# Patient Record
Sex: FEMALE | Race: WHITE | NOT HISPANIC OR LATINO | Employment: OTHER | URBAN - METROPOLITAN AREA
[De-identification: names, ages, dates, MRNs, and addresses within clinical notes are randomized per-mention and may not be internally consistent; named-entity substitution may affect disease eponyms.]

---

## 2020-06-12 ENCOUNTER — TELEPHONE (OUTPATIENT)
Dept: OPHTHALMOLOGY | Facility: CLINIC | Age: 80
End: 2020-06-12

## 2020-06-12 NOTE — TELEPHONE ENCOUNTER
----- Message from Eden Galicia sent at 6/12/2020  1:49 PM CDT -----  Contact: self [phone]  States that she is calling to schedule an appt with Dr Merlos. Please call back at [phone]//thank you acc

## 2020-06-15 ENCOUNTER — TELEPHONE (OUTPATIENT)
Dept: OPHTHALMOLOGY | Facility: CLINIC | Age: 80
End: 2020-06-15

## 2020-06-15 NOTE — TELEPHONE ENCOUNTER
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 But she is calling and requesting an appt w/ Dr. Cheung now.    Pts# [phone]

## 2020-06-15 NOTE — TELEPHONE ENCOUNTER
----- Message from Dane Smith sent at 6/15/2020  1:53 PM CDT -----  Regarding: sooner appointment  Contact: Dr. Maverick Temple would like an appointment with Dr. Cheung. She can be reached at [phone]. She would like something sooner that August 3. I didn't even have August I had 10/5

## 2020-06-15 NOTE — TELEPHONE ENCOUNTER
Pt will keep apt with Dr. Merlos.  Pt will call back if she wants to book in August with Dr. Cheung .  Pt verbalized understanding.

## 2020-06-17 ENCOUNTER — OFFICE VISIT (OUTPATIENT)
Dept: OPHTHALMOLOGY | Facility: CLINIC | Age: 80
End: 2020-06-17
Payer: MEDICARE

## 2020-06-17 DIAGNOSIS — H20.9 UVEITIS OF BOTH EYES: ICD-10-CM

## 2020-06-17 DIAGNOSIS — Z98.890 HISTORY OF REFRACTIVE SURGERY: ICD-10-CM

## 2020-06-17 DIAGNOSIS — H40.1133 PRIMARY OPEN ANGLE GLAUCOMA (POAG) OF BOTH EYES, SEVERE STAGE: Primary | ICD-10-CM

## 2020-06-17 DIAGNOSIS — H04.123 DRY EYE SYNDROME, BILATERAL: ICD-10-CM

## 2020-06-17 PROCEDURE — 92133 CPTRZD OPH DX IMG PST SGM ON: CPT | Mod: S$GLB,,, | Performed by: OPHTHALMOLOGY

## 2020-06-17 PROCEDURE — 92020 GONIOSCOPY: CPT | Mod: S$GLB,,, | Performed by: OPHTHALMOLOGY

## 2020-06-17 PROCEDURE — 92004 COMPRE OPH EXAM NEW PT 1/>: CPT | Mod: S$GLB,,, | Performed by: OPHTHALMOLOGY

## 2020-06-17 PROCEDURE — 99999 PR PBB SHADOW E&M-EST. PATIENT-LVL II: ICD-10-PCS | Mod: PBBFAC,,, | Performed by: OPHTHALMOLOGY

## 2020-06-17 PROCEDURE — 76514 ECHO EXAM OF EYE THICKNESS: CPT | Mod: S$GLB,,, | Performed by: OPHTHALMOLOGY

## 2020-06-17 PROCEDURE — 99999 PR PBB SHADOW E&M-EST. PATIENT-LVL II: CPT | Mod: PBBFAC,,, | Performed by: OPHTHALMOLOGY

## 2020-06-17 PROCEDURE — 76514 PR  US, EYE, FOR CORNEAL THICKNESS: ICD-10-PCS | Mod: S$GLB,,, | Performed by: OPHTHALMOLOGY

## 2020-06-17 PROCEDURE — 92020 PR SPECIAL EYE EVAL,GONIOSCOPY: ICD-10-PCS | Mod: S$GLB,,, | Performed by: OPHTHALMOLOGY

## 2020-06-17 PROCEDURE — 92004 PR EYE EXAM, NEW PATIENT,COMPREHESV: ICD-10-PCS | Mod: S$GLB,,, | Performed by: OPHTHALMOLOGY

## 2020-06-17 PROCEDURE — 92133 POSTERIOR SEGMENT OCT OPTIC NERVE(OCULAR COHERENCE TOMOGRAPHY) - OU - BOTH EYES: ICD-10-PCS | Mod: S$GLB,,, | Performed by: OPHTHALMOLOGY

## 2020-06-17 RX ORDER — DORZOLAMIDE HYDROCHLORIDE AND TIMOLOL MALEATE PRESERVATIVE FREE 20; 5 MG/ML; MG/ML
1 SOLUTION/ DROPS OPHTHALMIC 2 TIMES DAILY
Qty: 60 EACH | Refills: 6 | Status: SHIPPED | OUTPATIENT
Start: 2020-06-17 | End: 2021-01-26 | Stop reason: ALTCHOICE

## 2020-06-17 RX ORDER — BRIMONIDINE TARTRATE 1 MG/ML
SOLUTION/ DROPS OPHTHALMIC
COMMUNITY
Start: 2020-04-15 | End: 2020-12-01 | Stop reason: SDUPTHER

## 2020-06-17 RX ORDER — NETARSUDIL AND LATANOPROST OPHTHALMIC SOLUTION, 0.02%/0.005% .2; .05 MG/ML; MG/ML
SOLUTION/ DROPS OPHTHALMIC; TOPICAL
COMMUNITY
End: 2021-01-12

## 2020-06-17 RX ORDER — LATANOPROST 50 UG/ML
SOLUTION/ DROPS OPHTHALMIC
COMMUNITY
Start: 2020-04-19 | End: 2021-01-12

## 2020-06-17 RX ORDER — ESCITALOPRAM OXALATE 20 MG/1
20 TABLET ORAL
COMMUNITY
Start: 2019-04-08 | End: 2021-02-02

## 2020-06-17 RX ORDER — TAFLUPROST 0 MG/.3ML
1 SOLUTION/ DROPS OPHTHALMIC NIGHTLY
Qty: 1 EACH | Refills: 6 | Status: SHIPPED | OUTPATIENT
Start: 2020-06-17 | End: 2021-01-29 | Stop reason: ALTCHOICE

## 2020-06-17 RX ORDER — DORZOLAMIDE HYDROCHLORIDE AND TIMOLOL MALEATE 20; 5 MG/ML; MG/ML
SOLUTION/ DROPS OPHTHALMIC
COMMUNITY
Start: 2020-04-14 | End: 2021-01-26 | Stop reason: ALTCHOICE

## 2020-06-17 NOTE — PROGRESS NOTES
"HPI     The patient states that at this time her eyes are doing well & denies any   pain at this time. 100% drop compliance.     "Zaki-vi-cody"     pt     Glaucoma OU  RK OU  PCIOL OU   Yag OU  MMC OU  Trab OU  3x Bleb Needling OU (last one 6/2019)    Fhx of Glaucoma (2 brothers)    OU: Dorzolamide BID  OU: Alphagan BID  OU: Latanoprost QHS (was supposed to switch to Rocklatan but was burning   so d/c)    Last edited by Cecilia Worley on 6/17/2020  2:39 PM. (History)            Assessment /Plan     For exam results, see Encounter Report.      ICD-10-CM ICD-9-CM    1. Primary open angle glaucoma (POAG) of both eyes, severe stage  H40.1133 365.11 Posterior Segment OCT Optic Nerve- Both eyes  iop above optimal range OU but patient has significant ocular irritation which would make additional surgery less likely to be successful.     365.73    2. Dry eye syndrome, bilateral  H04.123 375.15    3. Uveitis of both eyes  H20.9 364.3    4. History of refractive surgery  Z98.890 V45.69      Cont latanoprost until zioptan is received in the mail     D/c current meds  Trial Zioptan qd OU  PF Cosopt bid  Return to clinic with hvf 3 weeks. May need repeat surgery - consider Xen if eyes are no longer red.                RETURN TO CLINIC FOR IOP AND REFRACT               "

## 2020-07-07 ENCOUNTER — OFFICE VISIT (OUTPATIENT)
Dept: OPHTHALMOLOGY | Facility: CLINIC | Age: 80
End: 2020-07-07
Payer: MEDICARE

## 2020-07-07 DIAGNOSIS — H04.123 DRY EYE SYNDROME, BILATERAL: ICD-10-CM

## 2020-07-07 DIAGNOSIS — Z98.890 HISTORY OF REFRACTIVE SURGERY: ICD-10-CM

## 2020-07-07 DIAGNOSIS — H20.9 UVEITIS OF BOTH EYES: ICD-10-CM

## 2020-07-07 DIAGNOSIS — H40.1133 PRIMARY OPEN ANGLE GLAUCOMA (POAG) OF BOTH EYES, SEVERE STAGE: Primary | ICD-10-CM

## 2020-07-07 PROCEDURE — 92083 HUMPHREY VISUAL FIELD - OU - BOTH EYES: ICD-10-PCS | Mod: S$GLB,,, | Performed by: OPHTHALMOLOGY

## 2020-07-07 PROCEDURE — 99999 PR PBB SHADOW E&M-EST. PATIENT-LVL III: ICD-10-PCS | Mod: PBBFAC,,, | Performed by: OPHTHALMOLOGY

## 2020-07-07 PROCEDURE — 99999 PR PBB SHADOW E&M-EST. PATIENT-LVL III: CPT | Mod: PBBFAC,,, | Performed by: OPHTHALMOLOGY

## 2020-07-07 PROCEDURE — 92083 EXTENDED VISUAL FIELD XM: CPT | Mod: S$GLB,,, | Performed by: OPHTHALMOLOGY

## 2020-07-07 PROCEDURE — 92012 INTRM OPH EXAM EST PATIENT: CPT | Mod: S$GLB,,, | Performed by: OPHTHALMOLOGY

## 2020-07-07 PROCEDURE — 92012 PR EYE EXAM, EST PATIENT,INTERMED: ICD-10-PCS | Mod: S$GLB,,, | Performed by: OPHTHALMOLOGY

## 2020-07-07 RX ORDER — BROMFENAC SODIUM 0.7 MG/ML
1 SOLUTION/ DROPS OPHTHALMIC DAILY
Qty: 3 ML | Refills: 1 | Status: SHIPPED | OUTPATIENT
Start: 2020-07-07 | End: 2021-04-17

## 2020-07-07 NOTE — PROGRESS NOTES
"HPI     Glaucoma     Comments: 3 week follow up with HVF and refraction              Comments     Patient feels no changes since last visit, using both drops as directed.  States eyes feel much better on PF meds.      "[facility]-cody"    Dr. Cristofer Worley pt     Glaucoma OU  RK OU  PCIOL OU   Yag OU  H/o Iritis   MMC OU  Trab OU 4/19 in [place]  3x Bleb Needling OU (last one 6/2019)    Fhx of Glaucoma (2 brothers)    OU: PF Cosopt BID,Zioptan QD  PF refresh several times per day          Last edited by Doc Merlos MD on 7/7/2020  4:35 PM. (History)            Assessment /Plan     For exam results, see Encounter Report.      ICD-10-CM ICD-9-CM    1. Primary open angle glaucoma (POAG) of both eyes, severe stage  H40.1133 365.11 Ruiz Visual Field - OU - Extended - Both Eyes  Explained that iop is above target and that patient needs additional treatment. Erythematous conj is better since patient is now on PF drops but is still present. Will proceed with Slt - explained that patient may need Ab-ext Xen - would stop coag meds 3 days prior to reduce surface irritation .     365.73    2. Dry eye syndrome, bilateral  H04.123 375.15 Improved    3. Uveitis of both eyes  H20.9 364.3 Quiet today   4. History of refractive surgery  Z98.890 V45.69        PF cosopt bid OU  Zioptan qd OU  Return to clinic for Slt OU - will need 1 day iop check post Slt              "

## 2020-07-08 ENCOUNTER — TELEPHONE (OUTPATIENT)
Dept: OPHTHALMOLOGY | Facility: CLINIC | Age: 80
End: 2020-07-08

## 2020-07-08 NOTE — TELEPHONE ENCOUNTER
Contacted patient to advise that [facility] is out-of-network with her insurance and asked if she had OON benefits.  Patient advised that she had spoken with her insurance and was told that he does have OON benefits.

## 2020-07-09 ENCOUNTER — OUTSIDE PLACE OF SERVICE (OUTPATIENT)
Dept: ADMINISTRATIVE | Facility: OTHER | Age: 80
End: 2020-07-09
Payer: MEDICARE

## 2020-07-09 PROCEDURE — 65855 PR TRABECULOPLASTY LASER SURGERY: ICD-10-PCS | Mod: 50,,, | Performed by: OPHTHALMOLOGY

## 2020-07-09 PROCEDURE — 65855 TRABECULOPLASTY LASER SURG: CPT | Mod: 50,,, | Performed by: OPHTHALMOLOGY

## 2020-07-10 ENCOUNTER — OFFICE VISIT (OUTPATIENT)
Dept: OPHTHALMOLOGY | Facility: CLINIC | Age: 80
End: 2020-07-10
Payer: MEDICARE

## 2020-07-10 DIAGNOSIS — H04.123 DRY EYE SYNDROME, BILATERAL: ICD-10-CM

## 2020-07-10 DIAGNOSIS — H40.1133 PRIMARY OPEN ANGLE GLAUCOMA (POAG) OF BOTH EYES, SEVERE STAGE: ICD-10-CM

## 2020-07-10 DIAGNOSIS — Z98.890 POST-OPERATIVE STATE: Primary | ICD-10-CM

## 2020-07-10 PROCEDURE — 99999 PR PBB SHADOW E&M-EST. PATIENT-LVL II: CPT | Mod: PBBFAC,,, | Performed by: OPHTHALMOLOGY

## 2020-07-10 PROCEDURE — 99024 PR POST-OP FOLLOW-UP VISIT: ICD-10-PCS | Mod: S$GLB,,, | Performed by: OPHTHALMOLOGY

## 2020-07-10 PROCEDURE — 99024 POSTOP FOLLOW-UP VISIT: CPT | Mod: S$GLB,,, | Performed by: OPHTHALMOLOGY

## 2020-07-10 PROCEDURE — 99999 PR PBB SHADOW E&M-EST. PATIENT-LVL II: ICD-10-PCS | Mod: PBBFAC,,, | Performed by: OPHTHALMOLOGY

## 2020-07-10 RX ORDER — ECONAZOLE NITRATE 10 MG/G
CREAM TOPICAL
COMMUNITY
Start: 2020-07-08

## 2020-07-10 RX ORDER — TERBINAFINE HYDROCHLORIDE 250 MG/1
TABLET ORAL
COMMUNITY
Start: 2020-07-08 | End: 2022-06-08 | Stop reason: ALTCHOICE

## 2020-07-10 NOTE — PROGRESS NOTES
"HPI     Pt here for 1 day SLT OU chk. No pain or discomfort. VA stable.     "Zaki-vi-cody"    Dr. Cristofer Worley pt     1. Glaucoma OU  SLT OU 7/9/2020  Trab OU 4/19 in [place]  3x Bleb Needling OU (last one 6/2019)  2. RK OU  3. PCIOL OU   Yag OU  4. H/o Iritis   MMC OU      Fhx of Glaucoma (2 brothers)  OU: Latanoprost QHS (was supposed to switch to Rocklatan but was burning   so d/c)    OU: PF Cosopt BID,Zioptan QD    Last edited by Chandan Moctezuma, Patient Care Assistant on 7/10/2020  2:06   PM. (History)            Assessment /Plan     For exam results, see Encounter Report.      ICD-10-CM ICD-9-CM    1. Post-operative state  Z98.890 V45.89    2. Primary open angle glaucoma (POAG) of both eyes, severe stage  H40.1133 365.11      365.73    3. Dry eye syndrome, bilateral  H04.123 375.15        S/P SLT right and left eye   Doing well  Ketorolac QID for 7 days operative eye  Continue glaucoma medications as ordered  RTC 4 weeks                "

## 2020-07-31 ENCOUNTER — OFFICE VISIT (OUTPATIENT)
Dept: OPHTHALMOLOGY | Facility: CLINIC | Age: 80
End: 2020-07-31
Payer: MEDICARE

## 2020-07-31 DIAGNOSIS — H20.9 IRITIS: ICD-10-CM

## 2020-07-31 DIAGNOSIS — H04.123 DRY EYE SYNDROME, BILATERAL: ICD-10-CM

## 2020-07-31 DIAGNOSIS — Z98.890 HISTORY OF REFRACTIVE SURGERY: ICD-10-CM

## 2020-07-31 DIAGNOSIS — H20.9 UVEITIS OF BOTH EYES: ICD-10-CM

## 2020-07-31 DIAGNOSIS — H40.1133 PRIMARY OPEN ANGLE GLAUCOMA (POAG) OF BOTH EYES, SEVERE STAGE: Primary | ICD-10-CM

## 2020-07-31 DIAGNOSIS — H35.30 AMD (AGE-RELATED MACULAR DEGENERATION), BILATERAL: ICD-10-CM

## 2020-07-31 PROCEDURE — 99999 PR PBB SHADOW E&M-EST. PATIENT-LVL II: ICD-10-PCS | Mod: PBBFAC,,, | Performed by: OPHTHALMOLOGY

## 2020-07-31 PROCEDURE — 92012 PR EYE EXAM, EST PATIENT,INTERMED: ICD-10-PCS | Mod: S$GLB,,, | Performed by: OPHTHALMOLOGY

## 2020-07-31 PROCEDURE — 92012 INTRM OPH EXAM EST PATIENT: CPT | Mod: S$GLB,,, | Performed by: OPHTHALMOLOGY

## 2020-07-31 PROCEDURE — 99999 PR PBB SHADOW E&M-EST. PATIENT-LVL II: CPT | Mod: PBBFAC,,, | Performed by: OPHTHALMOLOGY

## 2020-07-31 RX ORDER — LOTEPREDNOL ETABONATE 5 MG/ML
1 SUSPENSION/ DROPS OPHTHALMIC 3 TIMES DAILY
Qty: 5 ML | Refills: 0 | Status: SHIPPED | OUTPATIENT
Start: 2020-07-31 | End: 2020-08-07 | Stop reason: SDUPTHER

## 2020-07-31 NOTE — PROGRESS NOTES
"HPI     Glaucoma     Comments: 3 week IOP check              Comments     The patient states her eyes are doing fine and she denies  any ocular   pain at this time.    "Zaki-vi-cody"    Dr. Cristofer Worley pt     1. Glaucoma OU  SLT OU 7/9/2020  Trab OU 4/19 in [place]  3x Bleb Needling OU (last one 6/2019)  2. RK OU  3. PCIOL OU   Yag OU  4. H/o Iritis   MMC OU      Fhx of Glaucoma (2 brothers)  OU: Latanoprost QHS (was supposed to switch to Rocklatan but was burning   so d/c)    OU: PF Cosopt BID,Zioptan QD          Last edited by Doc Merlos MD on 7/31/2020 11:25 AM. (History)            Assessment /Plan     For exam results, see Encounter Report.      ICD-10-CM ICD-9-CM    1. Primary open angle glaucoma (POAG) of both eyes, severe stage  H40.1133 365.11 Not much response to SLT OU at this time   Pt is intolerant on other meds as per her history   Pt would need sx in the future- FEDERICO with pt that I recommend her having Trab vs Xen sx in the future      Pt very eager for sx now and wants to be penciled in for sx in the end of August      365.73    2. Dry eye syndrome, bilateral  H04.123 375.15    3. Uveitis of both eyes  H20.9 364.3 Very faint, will treat with Lotemax to see if the iop falls.   4. History of refractive surgery  Z98.890 V45.69    5.      AMD         Rule out the need for treatment OS       Return to clinic 1 week with Dr Kate to review the findings of mOCT  OS.     Add Lotemax TID OU to see if IOP falls   RETURN TO CLINIC with me the week of 8/17 for IOP check and will pencil in sx for 8/27   Pt told no sooner dates until then at this time as she must be followed closely the following week     RETURN TO CLINIC 1 month IOP   OU: PF Cosopt BID,Zioptan QD                "

## 2020-08-07 DIAGNOSIS — H20.9 IRITIS: ICD-10-CM

## 2020-08-07 RX ORDER — LOTEPREDNOL ETABONATE 5 MG/ML
1 SUSPENSION/ DROPS OPHTHALMIC 3 TIMES DAILY
Qty: 5 ML | Refills: 0 | Status: SHIPPED | OUTPATIENT
Start: 2020-08-07 | End: 2020-08-21 | Stop reason: SDUPTHER

## 2020-08-14 ENCOUNTER — OFFICE VISIT (OUTPATIENT)
Dept: OPHTHALMOLOGY | Facility: CLINIC | Age: 80
End: 2020-08-14
Payer: MEDICARE

## 2020-08-14 DIAGNOSIS — H34.8120 CENTRAL RETINAL VEIN OCCLUSION WITH MACULAR EDEMA OF LEFT EYE: ICD-10-CM

## 2020-08-14 DIAGNOSIS — H34.8322 STABLE BRANCH RETINAL VEIN OCCLUSION OF LEFT EYE: ICD-10-CM

## 2020-08-14 DIAGNOSIS — H35.3134 ADVANCED ATROPHIC NONEXUDATIVE AGE-RELATED MACULAR DEGENERATION OF BOTH EYES WITH SUBFOVEAL INVOLVEMENT: ICD-10-CM

## 2020-08-14 DIAGNOSIS — H35.373 EPIRETINAL MEMBRANE, BILATERAL: Primary | ICD-10-CM

## 2020-08-14 DIAGNOSIS — H04.129 DRYNESS, EYE: ICD-10-CM

## 2020-08-14 PROBLEM — F51.01 PRIMARY INSOMNIA: Status: ACTIVE | Noted: 2019-06-10

## 2020-08-14 PROBLEM — M54.81 OCCIPITAL NEURALGIA OF RIGHT SIDE: Status: ACTIVE | Noted: 2019-11-18

## 2020-08-14 PROBLEM — F41.9 ANXIETY: Status: ACTIVE | Noted: 2017-11-14

## 2020-08-14 PROBLEM — F33.1 MODERATE EPISODE OF RECURRENT MAJOR DEPRESSIVE DISORDER: Status: ACTIVE | Noted: 2020-03-09

## 2020-08-14 PROCEDURE — 99999 PR PBB SHADOW E&M-EST. PATIENT-LVL III: CPT | Mod: PBBFAC,,, | Performed by: OPHTHALMOLOGY

## 2020-08-14 PROCEDURE — 92235 FLUORESCEIN ANGIOGRAPHY - OU - BOTH EYES: ICD-10-PCS | Mod: S$GLB,,, | Performed by: OPHTHALMOLOGY

## 2020-08-14 PROCEDURE — 92014 COMPRE OPH EXAM EST PT 1/>: CPT | Mod: S$GLB,,, | Performed by: OPHTHALMOLOGY

## 2020-08-14 PROCEDURE — 99999 PR PBB SHADOW E&M-EST. PATIENT-LVL III: ICD-10-PCS | Mod: PBBFAC,,, | Performed by: OPHTHALMOLOGY

## 2020-08-14 PROCEDURE — 92235 FLUORESCEIN ANGRPH MLTIFRAME: CPT | Mod: S$GLB,,, | Performed by: OPHTHALMOLOGY

## 2020-08-14 PROCEDURE — 92014 PR EYE EXAM, EST PATIENT,COMPREHESV: ICD-10-PCS | Mod: S$GLB,,, | Performed by: OPHTHALMOLOGY

## 2020-08-14 RX ORDER — CICLOPIROX OLAMINE 7.7 MG/100ML
SUSPENSION TOPICAL
COMMUNITY
Start: 2020-07-20

## 2020-08-14 NOTE — PROGRESS NOTES
"    ===============================  Abby Temple,  8/14/2020 today   80 y.o. female   Last visit [facility]: :Visit date not found   Last visit eye dept. 7/31/2020  VA:  Uncorrected distance visual acuity was 20/40 in the right eye and 20/200 in the left eye.  Tonometry     Tonometry (Applanation, 2:12 PM)       Right Left    Pressure 21 18          Tonometry #2 (Applanation, 3:27 PM)       Right Left    Pressure 17 17               Not recorded         Not recorded        CYCLOPLEGIC     Cycloplegic Refraction       Sphere Cylinder Axis Dist VA    Right -3.75 +0.50 150 20/25    Left -0.75 +1.75 150 20/80-              Chief Complaint   Patient presents with    Detached Retina     amd eval     Ophthalmic Medications     Ophthalmic - Anti-inflammatory, Glucocorticoids Start End     loteprednol (LOTEMAX) 0.5 % ophthalmic suspension    8/7/2020     Sig: Place 1 drop into both eyes 3 (three) times daily.    Route: Both Eyes    Ophthalmic-Intraocular Pressure Reducing Agents, Prostaglandin Analogs Start End     latanoprost 0.005 % ophthalmic solution    4/19/2020     Class: Historical Med     tafluprost, PF, (ZIOPTAN, PF,) 0.0015 % Dpet    6/17/2020     Sig: Place 1 drop into both eyes every evening.    Route: Both Eyes    Ophthalmic-Intraocular Press. Reducing, Esther. Alpha Adrenergic Agonists Start End     brimonidine 0.1% (ALPHAGAN P) 0.1 % Drop    4/15/2020     Class: Historical Med        ________________  8/14/2020 today  HPI     Detached Retina      Additional comments: amd eval              Comments     "Zaki-vi-cody"    Dr. Cristofer Worley pt     1. Glaucoma OU  SLT OU 7/9/2020  Trab OU 4/19 in [place]  3x Bleb Needling OU (last one 6/2019)  2. RK OU  3. PCIOL OU   Yag OU  4. H/o Iritis   MMC OU      Fhx of Glaucoma (2 brothers)  OU: Latanoprost QHS (was supposed to switch to Rocklatan but was burning   so d/c)    OU: PF Cosopt BID,Zioptan QD          Last edited by HIREN Kate MD on 8/14/2020  2:39 PM. " (History)      Problem List Items Addressed This Visit        Eye/Vision problems    Epiretinal membrane, bilateral - Primary    Relevant Orders    Flourescein Angiography - OU - Both Eyes    Advanced atrophic nonexudative age-related macular degeneration of both eyes with subfoveal involvement    Relevant Orders    Flourescein Angiography - OU - Both Eyes    Central retinal vein occlusion with macular edema of left eye       Other    Dryness, eye        V va os ps 2 years  seeking glaucoma expertise   Retired anaesthetist    Bilateral erm  Post rk, discussed visual compromise post rk  OS pigmented vitreous cell, periphery ok (hapticitis)   Os difficult view of periphery secondary to plate haptic iop    OD 3+ SPK, OS 1+ spk    Today improved central rped OS    OS vascular loop on disc vs disc collaterals, pp exudate  ivfa today    IVFA OD normal perfusion, 2 to 3+ RPE mottling  OS exudates, loops of blood vessels, classic diffuse petaloid cme, disc collaterals  OS old subtle crvo  bilateral erm  nfl asymmetry midteens os vs mid 20s od    Recommend increased at's      Will discuss with Dr. Merlos  1. MR  2. Post RK status k map irregular astigmatism, ?HCL  3. IOL position with respect to vitreous cell OS  4. SPK dry eye medicamentosa    Repeat moct at next visit with Dr. Merlos        .      ===========================

## 2020-08-14 NOTE — Clinical Note
Lets discuss  1. MR  2. Post RK status k map irregular astigmatism, ?HCL  3. IOL position with respect to vitreous cell OS  4. SPK dry eye medicamentosa    And repeat moct at your visit on Friday

## 2020-08-21 ENCOUNTER — OFFICE VISIT (OUTPATIENT)
Dept: OPHTHALMOLOGY | Facility: CLINIC | Age: 80
End: 2020-08-21
Payer: MEDICARE

## 2020-08-21 DIAGNOSIS — H35.30 AMD (AGE-RELATED MACULAR DEGENERATION), BILATERAL: ICD-10-CM

## 2020-08-21 DIAGNOSIS — H34.8120 CENTRAL RETINAL VEIN OCCLUSION WITH MACULAR EDEMA OF LEFT EYE: ICD-10-CM

## 2020-08-21 DIAGNOSIS — H35.373 EPIRETINAL MEMBRANE, BILATERAL: ICD-10-CM

## 2020-08-21 DIAGNOSIS — H04.129 DRYNESS, EYE: ICD-10-CM

## 2020-08-21 DIAGNOSIS — H40.1133 PRIMARY OPEN ANGLE GLAUCOMA (POAG) OF BOTH EYES, SEVERE STAGE: Primary | ICD-10-CM

## 2020-08-21 DIAGNOSIS — H35.3134 ADVANCED ATROPHIC NONEXUDATIVE AGE-RELATED MACULAR DEGENERATION OF BOTH EYES WITH SUBFOVEAL INVOLVEMENT: ICD-10-CM

## 2020-08-21 DIAGNOSIS — Z98.890 HISTORY OF REFRACTIVE SURGERY: ICD-10-CM

## 2020-08-21 DIAGNOSIS — H20.9 IRITIS: ICD-10-CM

## 2020-08-21 DIAGNOSIS — H04.123 DRY EYE SYNDROME, BILATERAL: ICD-10-CM

## 2020-08-21 PROCEDURE — 99999 PR PBB SHADOW E&M-EST. PATIENT-LVL II: ICD-10-PCS | Mod: PBBFAC,,, | Performed by: OPHTHALMOLOGY

## 2020-08-21 PROCEDURE — 92134 CPTRZ OPH DX IMG PST SGM RTA: CPT | Mod: S$GLB,,, | Performed by: OPHTHALMOLOGY

## 2020-08-21 PROCEDURE — 99999 PR PBB SHADOW E&M-EST. PATIENT-LVL II: CPT | Mod: PBBFAC,,, | Performed by: OPHTHALMOLOGY

## 2020-08-21 PROCEDURE — 92012 INTRM OPH EXAM EST PATIENT: CPT | Mod: S$GLB,,, | Performed by: OPHTHALMOLOGY

## 2020-08-21 PROCEDURE — 92134 POSTERIOR SEGMENT OCT RETINA (OCULAR COHERENCE TOMOGRAPHY)-BOTH EYES: ICD-10-PCS | Mod: S$GLB,,, | Performed by: OPHTHALMOLOGY

## 2020-08-21 PROCEDURE — 92012 PR EYE EXAM, EST PATIENT,INTERMED: ICD-10-PCS | Mod: S$GLB,,, | Performed by: OPHTHALMOLOGY

## 2020-08-21 RX ORDER — LOTEPREDNOL ETABONATE 5 MG/ML
1 SUSPENSION/ DROPS OPHTHALMIC 3 TIMES DAILY
Qty: 5 ML | Refills: 0 | Status: SHIPPED | OUTPATIENT
Start: 2020-08-21 | End: 2020-09-18 | Stop reason: ALTCHOICE

## 2020-08-21 NOTE — PROGRESS NOTES
"HPI     Patient returns for a repeat moct , and iop check.    OU LOTEMAX TID        "Zaki-vi-cody"    Dr. Cristofer Worley pt     1. Glaucoma OU  SLT OU 7/9/2020  Trab OU 4/19 in [place]  3x Bleb Needling OU (last one 6/2019)  2. RK OU  3. PCIOL OU   Yag OU  4. H/o Iritis   MMC OU      Fhx of Glaucoma (2 brothers)  OU: Latanoprost QHS (was supposed to switch to Rocklatan but was burning   so d/c)    OU: PF Cosopt BID,Zioptan QD, lOTEMAX TID          Last edited by Doc Merlos MD on 8/21/2020  2:10 PM. (History)            Assessment /Plan     For exam results, see Encounter Report.      ICD-10-CM ICD-9-CM    1. Primary open angle glaucoma (POAG) of both eyes, severe stage  H40.1133 365.11 See discussion below.     365.73    2. Epiretinal membrane, bilateral  H35.373 362.56 Posterior Segment OCT Retina-Both eyes   3. 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 Dryness, eye  H04.129 375.15    5. Central retinal vein occlusion with macular edema of left eye  H34.8120 362.35      362.83    6. Dry eye syndrome, bilateral  H04.123 375.15    7. AMD (age-related macular degeneration), bilateral  H35.30 362.50    8. History of refractive surgery  Z98.890 V45.69          Moct Findings today after discussed with Dr. Kate at length:  Old CRVO Subtle w/ Chronic findings OS - Dr Kate feels this is the likely cause of decreased vision - combined with irregular astigmatism on [place]   And he does not  feel that CME tx would improve visual Outcome as the CME on OCT in the left eye is consistent with 20/40 or slightly better visual acuity.  Pt has 20/400 Va OS today yet appears to have a visual potential that is better than that based on the OCT appearance alone: from very trace cme present on exam     [place] also done today     Pt need RGP cls diagnostic fitting to determine what part of her visual loss is " due to irregular astigmatism before we can proceed with glaucoma sx.    RETURN TO CLINIC 1 week for RGP fitting for diagnostic purposes only with DNL - to determine visual potential once irregular astigmatism is eliminated.  I will review with Dr. Simons and call patient after.   Will wait on glaucoma sx for now   FEDERICO with pt that we may likely do coag sx on left eye first  to see how her eye responds after sx so we can better plan OD surgery with respect to amount and duration of MMC exposure. Will place referral today.    Reduce Lotemax BID OU     Will review chart after DL's visit and determine next step.

## 2020-08-21 NOTE — Clinical Note
Osman,  Please do a RGP fit on this patient's left eye to determine best corrected acuity and sent the chart back to me. She does not want to wear a contact lens at this time but if her acuity improves perhaps she will change her mind. She will need glaucoma surgery in the near future.  Thanks for your help!

## 2020-08-25 ENCOUNTER — OFFICE VISIT (OUTPATIENT)
Dept: OPHTHALMOLOGY | Facility: CLINIC | Age: 80
End: 2020-08-25
Payer: MEDICARE

## 2020-08-25 DIAGNOSIS — H52.212 IRREGULAR ASTIGMATISM OF LEFT EYE: Primary | ICD-10-CM

## 2020-08-25 PROCEDURE — 99999 PR PBB SHADOW E&M-EST. PATIENT-LVL II: ICD-10-PCS | Mod: PBBFAC,,, | Performed by: OPTOMETRIST

## 2020-08-25 PROCEDURE — 99499 UNLISTED E&M SERVICE: CPT | Mod: S$GLB,,, | Performed by: OPTOMETRIST

## 2020-08-25 PROCEDURE — 92310 PR CONTACT LENS FITTING (NO CHANGE): ICD-10-PCS | Mod: CSM,S$GLB,, | Performed by: OPTOMETRIST

## 2020-08-25 PROCEDURE — 99499 NO LOS: ICD-10-PCS | Mod: S$GLB,,, | Performed by: OPTOMETRIST

## 2020-08-25 PROCEDURE — 99999 PR PBB SHADOW E&M-EST. PATIENT-LVL II: CPT | Mod: PBBFAC,,, | Performed by: OPTOMETRIST

## 2020-08-25 PROCEDURE — 92310 CONTACT LENS FITTING OU: CPT | Mod: CSM,S$GLB,, | Performed by: OPTOMETRIST

## 2020-08-25 NOTE — PROGRESS NOTES
HPI     NP to DNL  Last seen by [facility] on 8/21/2020 for POAG  Patient here today for CTL fit  HPI    Any vision changes since last exam: No   Eye pain: No  Other ocular symptoms: No    Do you wear currently wear glasses or contacts? None    Interested in contacts today? Yes    Do you plan on getting new glasses today? No          Last edited by Elizabeth Garcia, PCT on 8/25/2020  9:52 AM. (History)              Assessment /Plan     For exam results, see Encounter Report.    Irregular astigmatism of left eye      Diagnostic fit with RGP today showed minimal improvement in visual acuity: 20/100 OS  Will route chart to Dr Merlos for review

## 2020-08-25 NOTE — Clinical Note
No real improvement in va. She did seem to have a hard time finding central usable vision especially behind the phoropter.

## 2020-09-04 ENCOUNTER — TELEPHONE (OUTPATIENT)
Dept: OPHTHALMOLOGY | Facility: CLINIC | Age: 80
End: 2020-09-04

## 2020-09-04 NOTE — TELEPHONE ENCOUNTER
Pt wants to know if she has an appt on Tuesday.  Someone called to change her appt and she thought they said it was on Thursday.  Chart shows Tuesday appt .  Will give to appt coordinators.

## 2020-09-08 ENCOUNTER — OFFICE VISIT (OUTPATIENT)
Dept: OPHTHALMOLOGY | Facility: CLINIC | Age: 80
End: 2020-09-08
Payer: MEDICARE

## 2020-09-08 DIAGNOSIS — H40.1133 PRIMARY OPEN ANGLE GLAUCOMA (POAG) OF BOTH EYES, SEVERE STAGE: Primary | ICD-10-CM

## 2020-09-08 DIAGNOSIS — H04.129 DRYNESS, EYE: ICD-10-CM

## 2020-09-08 DIAGNOSIS — H34.8120 CENTRAL RETINAL VEIN OCCLUSION WITH MACULAR EDEMA OF LEFT EYE: ICD-10-CM

## 2020-09-08 DIAGNOSIS — H35.373 EPIRETINAL MEMBRANE, BILATERAL: ICD-10-CM

## 2020-09-08 DIAGNOSIS — H52.212 IRREGULAR ASTIGMATISM OF LEFT EYE: ICD-10-CM

## 2020-09-08 DIAGNOSIS — H35.3134 ADVANCED ATROPHIC NONEXUDATIVE AGE-RELATED MACULAR DEGENERATION OF BOTH EYES WITH SUBFOVEAL INVOLVEMENT: ICD-10-CM

## 2020-09-08 PROCEDURE — 99999 PR PBB SHADOW E&M-EST. PATIENT-LVL III: ICD-10-PCS | Mod: PBBFAC,,, | Performed by: OPHTHALMOLOGY

## 2020-09-08 PROCEDURE — 99999 PR PBB SHADOW E&M-EST. PATIENT-LVL III: CPT | Mod: PBBFAC,,, | Performed by: OPHTHALMOLOGY

## 2020-09-08 PROCEDURE — 92012 PR EYE EXAM, EST PATIENT,INTERMED: ICD-10-PCS | Mod: S$GLB,,, | Performed by: OPHTHALMOLOGY

## 2020-09-08 PROCEDURE — 92012 INTRM OPH EXAM EST PATIENT: CPT | Mod: S$GLB,,, | Performed by: OPHTHALMOLOGY

## 2020-09-08 RX ORDER — POLYMYXIN B SULFATE AND TRIMETHOPRIM 1; 10000 MG/ML; [USP'U]/ML
1 SOLUTION OPHTHALMIC EVERY 4 HOURS
Qty: 10 ML | Refills: 1 | Status: SHIPPED | OUTPATIENT
Start: 2020-09-08 | End: 2020-09-18 | Stop reason: ALTCHOICE

## 2020-09-08 RX ORDER — DUREZOL 0.5 MG/ML
1 EMULSION OPHTHALMIC 4 TIMES DAILY
Qty: 5 ML | Refills: 1 | Status: SHIPPED | OUTPATIENT
Start: 2020-09-08 | End: 2020-09-18 | Stop reason: SDUPTHER

## 2020-09-08 RX ORDER — POLYMYXIN B SULFATE AND TRIMETHOPRIM 1; 10000 MG/ML; [USP'U]/ML
1 SOLUTION OPHTHALMIC EVERY 4 HOURS
Qty: 5 ML | Refills: 1 | Status: SHIPPED | OUTPATIENT
Start: 2020-09-08 | End: 2020-09-08

## 2020-09-08 RX ORDER — BROMFENAC SODIUM 0.7 MG/ML
1 SOLUTION/ DROPS OPHTHALMIC DAILY
Qty: 3 ML | Refills: 1 | Status: SHIPPED | OUTPATIENT
Start: 2020-09-08 | End: 2020-09-18 | Stop reason: SDUPTHER

## 2020-09-08 RX ORDER — BROMFENAC SODIUM 0.7 MG/ML
1 SOLUTION/ DROPS OPHTHALMIC DAILY
Qty: 3 ML | Refills: 1 | Status: SHIPPED | OUTPATIENT
Start: 2020-09-08 | End: 2020-09-08

## 2020-09-08 RX ORDER — DUREZOL 0.5 MG/ML
1 EMULSION OPHTHALMIC 4 TIMES DAILY
Qty: 1 BOTTLE | Refills: 1 | Status: SHIPPED | OUTPATIENT
Start: 2020-09-08 | End: 2020-09-08

## 2020-09-08 NOTE — PROGRESS NOTES
"HPI     PATIENT RETURNS FOR A 3 WEEK IOP CHECK, PATIENT'S lOTEMAX WAS REDUCED   FROM tid ou, to Bid ou.  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 Cristofer Worley pt     1. Glaucoma OU  SLT OU 7/9/2020  Trab OU 4/19 in [place]  3x Bleb Needling OU (last one 6/2019)  2. RK OU  3. PCIOL OU   Yag OU  4. H/o Iritis   MMC OU      Fhx of Glaucoma (2 brothers)  OU: Latanoprost QHS (was supposed to switch to Rocklatan but was burning   so d/c)  OU LOTEMAX BID  OU: PF Cosopt BID,Zioptan QD      Last edited by Doc Merlos MD on 9/8/2020 10:49 AM. (History)            Assessment /Plan     For exam results, see Encounter Report.      ICD-10-CM ICD-9-CM    1. Primary open angle glaucoma (POAG) of both eyes, severe stage  H40.1133 365.11 Uncontrolled glaucoma OU on maximum tolerated therapy: Significant risk of continued irreversible glaucomatous vision loss with current level of treatment. Therefore surgical options were reviewed at great length with the pt and sx  recommended in hopes of reducing the IOP to a more tolerable level. A lengthy discussion of the risks, benefits and alternatives was presented to the pt including balancing the immediate risks of vision loss from surgery vs the likelihood of continued vision loss from the inadequately controlled glaucoma. Also discussed the need for frequent, careful follow-up exams for monitoring and other possible postop adjustments.       Pt desires to have the left eye sx first     Book coag sx left eye       Will use Durezol, Ilevro/ Prolensa and Poly for post-op     And  Consider or try Xen Gel vs Tube Shunt to the left eye due to history of scarring       Offered to see Dr. Cheung for Second Opinion as pt requested but she decided not to proceed.     365.73    2. Irregular astigmatism of left eye  H52.212 367.22    3. Epiretinal membrane, bilateral  H35.373 362.56 Appears stable- limiting vision    4. Advanced atrophic nonexudative " age-related macular degeneration of both eyes with subfoveal involvement  H35.3134 362.51 Followed by Dr Kate    5. Dryness, eye  H04.129 375.15    6. Central retinal vein occlusion with macular edema of left eye  H34.8120 362.35 Also limiting vision      362.83      OU LOTEMAX BID  OU: PF Cosopt BID,Zioptan QD  Xen or Ahmed OS, d/c Zioptan OS today

## 2020-09-10 ENCOUNTER — OUTSIDE PLACE OF SERVICE (OUTPATIENT)
Dept: ADMINISTRATIVE | Facility: OTHER | Age: 80
End: 2020-09-10
Payer: MEDICARE

## 2020-09-10 PROCEDURE — 66180 PR WATER SHUNT-EXTRAOCUL RESERV: ICD-10-PCS | Mod: LT,,, | Performed by: OPHTHALMOLOGY

## 2020-09-10 PROCEDURE — 66180 AQUEOUS SHUNT EYE W/GRAFT: CPT | Mod: LT,,, | Performed by: OPHTHALMOLOGY

## 2020-09-11 ENCOUNTER — OFFICE VISIT (OUTPATIENT)
Dept: OPHTHALMOLOGY | Facility: CLINIC | Age: 80
End: 2020-09-11
Payer: MEDICARE

## 2020-09-11 DIAGNOSIS — H40.1133 PRIMARY OPEN ANGLE GLAUCOMA (POAG) OF BOTH EYES, SEVERE STAGE: ICD-10-CM

## 2020-09-11 DIAGNOSIS — Z98.890 POST-OPERATIVE STATE: Primary | ICD-10-CM

## 2020-09-11 PROCEDURE — 99999 PR PBB SHADOW E&M-EST. PATIENT-LVL III: ICD-10-PCS | Mod: PBBFAC,,, | Performed by: OPHTHALMOLOGY

## 2020-09-11 PROCEDURE — 99024 POSTOP FOLLOW-UP VISIT: CPT | Mod: S$GLB,,, | Performed by: OPHTHALMOLOGY

## 2020-09-11 PROCEDURE — 99024 PR POST-OP FOLLOW-UP VISIT: ICD-10-PCS | Mod: S$GLB,,, | Performed by: OPHTHALMOLOGY

## 2020-09-11 PROCEDURE — 99999 PR PBB SHADOW E&M-EST. PATIENT-LVL III: CPT | Mod: PBBFAC,,, | Performed by: OPHTHALMOLOGY

## 2020-09-11 RX ORDER — ACETAZOLAMIDE 500 MG/1
500 CAPSULE, EXTENDED RELEASE ORAL 2 TIMES DAILY
Qty: 5 CAPSULE | Refills: 0 | Status: SHIPPED | OUTPATIENT
Start: 2020-09-11 | End: 2020-09-18 | Stop reason: ALTCHOICE

## 2020-09-11 RX ORDER — NEOMYCIN SULFATE, POLYMYXIN B SULFATE, AND DEXAMETHASONE 3.5; 10000; 1 MG/G; [USP'U]/G; MG/G
OINTMENT OPHTHALMIC 3 TIMES DAILY
Qty: 3.5 G | Refills: 3 | Status: SHIPPED | OUTPATIENT
Start: 2020-09-11 | End: 2020-09-14 | Stop reason: SDUPTHER

## 2020-09-11 NOTE — PROGRESS NOTES
"HPI     The patient states at 2AM she woke up in extreme pain in her left eye,   left ear, and left side of her head. The patient states it feels like   someone is stabbing her and rates her pain a 10/10. The patient did not   remove her eye patch and it was removed in clinic today at 8:45,.    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 Cristofer Worley pt     1. Glaucoma OU  SLT OU 7/9/2020  Trab OU 4/19 in [place]  3x Bleb Needling OU (last one 6/2019)  Xen OS 9/10/20  2. RK OU  3. PCIOL OU   Yag OU  4. H/o Iritis   MMC OU    OS- Durezol QID, Ilevro QD, Poly  Fhx of Glaucoma (2 brothers)  OU: Latanoprost QHS (was supposed to switch to Rocklatan but was burning   so d/c)  OU LOTEMAX BID  OU: PF Cosopt BID,Zioptan QD    Last edited by Blanquita Alejandra on 9/11/2020  8:45 AM. (History)            Assessment /Plan     For exam results, see Encounter Report.      ICD-10-CM ICD-9-CM    1. Post-operative state  Z98.890 V45.89 S/P Xen OS 9/10/20  IOP spiked today  Pressure lowered through paracenteesis /  aqueous release   And   Drop cascade of Travatan Z, Timolol, Brimonidine, And Azopt was used 1 gtt OU   2. Primary open angle glaucoma (POAG) of both eyes, severe stage  H40.1133 365.11      365.73        Return to clinic 3 days     OS- Durezol QID, Ilevro QD, Poly  Start  mg 1 tablet tonight, 1 Sat AM, 1 sat PM, and 1 Sunday AM  Add Dexacine ointment       Pt told Dr Merlos will call her over the weekend and see how she is feeling       OU: PF Cosopt BID,Zioptan QD     Lotemax BID OD only   "

## 2020-09-12 ENCOUNTER — TELEPHONE (OUTPATIENT)
Dept: OPHTHALMOLOGY | Facility: CLINIC | Age: 80
End: 2020-09-12

## 2020-09-12 DIAGNOSIS — H40.1133 PRIMARY OPEN ANGLE GLAUCOMA (POAG) OF BOTH EYES, SEVERE STAGE: ICD-10-CM

## 2020-09-12 DIAGNOSIS — Z98.890 POST-OPERATIVE STATE: Primary | ICD-10-CM

## 2020-09-12 NOTE — TELEPHONE ENCOUNTER
Examined patient as follow op from IOP spike.     Va Os 20/400    Imp:  S/p Ahmed with iop spike  Plan:  Continue meds and d/c Diamox tomorrow and d/c glaucoma meds Monday am  Return to clinic 2 days

## 2020-09-14 ENCOUNTER — OFFICE VISIT (OUTPATIENT)
Dept: OPHTHALMOLOGY | Facility: CLINIC | Age: 80
End: 2020-09-14
Payer: MEDICARE

## 2020-09-14 DIAGNOSIS — Z98.890 POST-OPERATIVE STATE: Primary | ICD-10-CM

## 2020-09-14 DIAGNOSIS — H40.1133 PRIMARY OPEN ANGLE GLAUCOMA (POAG) OF BOTH EYES, SEVERE STAGE: ICD-10-CM

## 2020-09-14 PROCEDURE — 99999 PR PBB SHADOW E&M-EST. PATIENT-LVL III: ICD-10-PCS | Mod: PBBFAC,,, | Performed by: OPHTHALMOLOGY

## 2020-09-14 PROCEDURE — 99024 PR POST-OP FOLLOW-UP VISIT: ICD-10-PCS | Mod: S$GLB,,, | Performed by: OPHTHALMOLOGY

## 2020-09-14 PROCEDURE — 99999 PR PBB SHADOW E&M-EST. PATIENT-LVL III: CPT | Mod: PBBFAC,,, | Performed by: OPHTHALMOLOGY

## 2020-09-14 PROCEDURE — 99024 POSTOP FOLLOW-UP VISIT: CPT | Mod: S$GLB,,, | Performed by: OPHTHALMOLOGY

## 2020-09-14 RX ORDER — NEOMYCIN SULFATE, POLYMYXIN B SULFATE, AND DEXAMETHASONE 3.5; 10000; 1 MG/G; [USP'U]/G; MG/G
OINTMENT OPHTHALMIC 3 TIMES DAILY
Qty: 3 G | Refills: 1 | Status: SHIPPED | OUTPATIENT
Start: 2020-09-14 | End: 2020-10-13 | Stop reason: SDUPTHER

## 2020-09-18 ENCOUNTER — OFFICE VISIT (OUTPATIENT)
Dept: OPHTHALMOLOGY | Facility: CLINIC | Age: 80
End: 2020-09-18
Payer: MEDICARE

## 2020-09-18 DIAGNOSIS — H40.1133 PRIMARY OPEN ANGLE GLAUCOMA (POAG) OF BOTH EYES, SEVERE STAGE: ICD-10-CM

## 2020-09-18 DIAGNOSIS — H20.9 IRITIS: ICD-10-CM

## 2020-09-18 DIAGNOSIS — Z98.890 POST-OPERATIVE STATE: Primary | ICD-10-CM

## 2020-09-18 PROCEDURE — 99024 PR POST-OP FOLLOW-UP VISIT: ICD-10-PCS | Mod: S$GLB,,, | Performed by: OPHTHALMOLOGY

## 2020-09-18 PROCEDURE — 99024 POSTOP FOLLOW-UP VISIT: CPT | Mod: S$GLB,,, | Performed by: OPHTHALMOLOGY

## 2020-09-18 PROCEDURE — 99999 PR PBB SHADOW E&M-EST. PATIENT-LVL I: CPT | Mod: PBBFAC,,, | Performed by: OPHTHALMOLOGY

## 2020-09-18 PROCEDURE — 99999 PR PBB SHADOW E&M-EST. PATIENT-LVL I: ICD-10-PCS | Mod: PBBFAC,,, | Performed by: OPHTHALMOLOGY

## 2020-09-18 RX ORDER — DUREZOL 0.5 MG/ML
1 EMULSION OPHTHALMIC 4 TIMES DAILY
Qty: 5 ML | Refills: 1 | Status: SHIPPED | OUTPATIENT
Start: 2020-09-18 | End: 2020-09-18 | Stop reason: SDUPTHER

## 2020-09-18 RX ORDER — LOTEPREDNOL ETABONATE 5 MG/ML
1 SUSPENSION/ DROPS OPHTHALMIC 2 TIMES DAILY
Qty: 5 ML | Refills: 0
Start: 2020-09-18 | End: 2020-09-23 | Stop reason: ALTCHOICE

## 2020-09-18 RX ORDER — BROMFENAC SODIUM 0.7 MG/ML
1 SOLUTION/ DROPS OPHTHALMIC DAILY
Qty: 3 ML | Refills: 1 | Status: SHIPPED | OUTPATIENT
Start: 2020-09-18 | End: 2020-09-23 | Stop reason: ALTCHOICE

## 2020-09-18 RX ORDER — DUREZOL 0.5 MG/ML
1 EMULSION OPHTHALMIC 4 TIMES DAILY
Qty: 5 ML | Refills: 1 | Status: SHIPPED | OUTPATIENT
Start: 2020-09-18 | End: 2020-10-18

## 2020-09-18 NOTE — PROGRESS NOTES
"HPI     Patient returns for a 4 day p.o. visit, patient states 0/10 on pain scale   in OS  Just a heavy sensation.        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 Cristofer Worley pt     1. Glaucoma OU  Fhx of Glaucoma (2 brothers)  SLT OU 7/9/2020  Trab OU 4/19 in [place]  3x Bleb Needling OU (last one 6/2019)  Ahmed OS 9/10/20/ [id number]/REF/FP7/LOT   2. RK OU  3. PCIOL OU   Yag OU  4. H/o Iritis   MMC OU    OS- Durezol QID, Ilevro QD, Poly QID ?  OD: Latanoprost QHS (was supposed to switch to Rocklatan but was burning   so d/c)  OD LOTEMAX BID  OD: PF Cosopt BID,Zioptan QD    Last edited by FATOUMATA Mahajan on 9/18/2020 11:35 AM. (History)            Assessment /Plan     For exam results, see Encounter Report.      ICD-10-CM ICD-9-CM    1. Post-operative state  Z98.890 V45.89    2. Primary open angle glaucoma (POAG) of both eyes, severe stage  H40.1133 365.11 difluprednate (DUREZOL) 0.05 % Drop ophthalmic solution     365.73        Doing well post Ahmed left eye   Left eye:  Durezol qid  Prolensa qd  Stop poly  Right eye:  OD LOTEMAX BID  OD: PF Cosopt BID,Zioptan QD   Patient will re try Alphagan BID OD   Return to clinic 1 week          "

## 2020-09-23 ENCOUNTER — OFFICE VISIT (OUTPATIENT)
Dept: OPHTHALMOLOGY | Facility: CLINIC | Age: 80
End: 2020-09-23
Payer: MEDICARE

## 2020-09-23 DIAGNOSIS — Z98.890 POST-OPERATIVE STATE: Primary | ICD-10-CM

## 2020-09-23 DIAGNOSIS — H40.1133 PRIMARY OPEN ANGLE GLAUCOMA (POAG) OF BOTH EYES, SEVERE STAGE: ICD-10-CM

## 2020-09-23 PROCEDURE — 99024 PR POST-OP FOLLOW-UP VISIT: ICD-10-PCS | Mod: S$GLB,,, | Performed by: OPHTHALMOLOGY

## 2020-09-23 PROCEDURE — 99999 PR PBB SHADOW E&M-EST. PATIENT-LVL III: ICD-10-PCS | Mod: PBBFAC,,, | Performed by: OPHTHALMOLOGY

## 2020-09-23 PROCEDURE — 99024 POSTOP FOLLOW-UP VISIT: CPT | Mod: S$GLB,,, | Performed by: OPHTHALMOLOGY

## 2020-09-23 PROCEDURE — 99999 PR PBB SHADOW E&M-EST. PATIENT-LVL III: CPT | Mod: PBBFAC,,, | Performed by: OPHTHALMOLOGY

## 2020-09-23 NOTE — PROGRESS NOTES
"HPI       Patient returns for a 5 day p.o. visit, patient states she is tolerating   the Alphagan just fine and did Prolensa filled and is using once daily in   OS.        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 Cristofer Worley pt     1. Glaucoma OU  Fhx of Glaucoma (2 brothers)  SLT OU 7/9/2020  Trab OU 4/19 in [place]  3x Bleb Needling OU (last one 6/2019)  Ahmed OS 9/10/20/ [id number]/REF/FP7/LOT   2. RK OU  3. PCIOL OU   Yag OU  4. H/o Iritis   MMC OU    OS-DEXACINE OINTMENT BID ,  ( OR PRN ONLY ) DUREZOL QID,Prolensa qd   OD: Latanoprost QHS (was supposed to switch to Rocklatan but was burning   so d/c)  OD LOTEMAX BID ?  OD: PF Cosopt BID,Zioptan QD, Alphagan bid        Last edited by FATOUMATA Mahajan on 9/23/2020 11:59 AM. (History)            Assessment /Plan     For exam results, see Encounter Report.      ICD-10-CM ICD-9-CM    1. Post-operative state  Z98.890 V45.89    2. Primary open angle glaucoma (POAG) of both eyes, severe stage  H40.1133 365.11      365.73        PO Week 1 S/P  Ahmed left eye:   Doing well with no evidence of infection or abnormal inflamman  Continue   OS Durezol qid, d/c Prolensa qd  OD PF Cosopt bid, zioptan qd , Alphagan P    Stop Lotemax OD and see if the iop rises    RTC 1 weeks or PRN pain, redness, vision loss, or other concerns.                     "

## 2020-09-25 ENCOUNTER — TELEPHONE (OUTPATIENT)
Dept: OPHTHALMOLOGY | Facility: CLINIC | Age: 80
End: 2020-09-25

## 2020-09-25 ENCOUNTER — OFFICE VISIT (OUTPATIENT)
Dept: OPHTHALMOLOGY | Facility: CLINIC | Age: 80
End: 2020-09-25
Payer: MEDICARE

## 2020-09-25 DIAGNOSIS — Z98.890 POST-OPERATIVE STATE: Primary | ICD-10-CM

## 2020-09-25 PROCEDURE — 99024 POSTOP FOLLOW-UP VISIT: CPT | Mod: S$GLB,,, | Performed by: OPHTHALMOLOGY

## 2020-09-25 PROCEDURE — 99999 PR PBB SHADOW E&M-EST. PATIENT-LVL I: CPT | Mod: PBBFAC,,, | Performed by: OPHTHALMOLOGY

## 2020-09-25 PROCEDURE — 99999 PR PBB SHADOW E&M-EST. PATIENT-LVL I: ICD-10-PCS | Mod: PBBFAC,,, | Performed by: OPHTHALMOLOGY

## 2020-09-25 PROCEDURE — 99024 PR POST-OP FOLLOW-UP VISIT: ICD-10-PCS | Mod: S$GLB,,, | Performed by: OPHTHALMOLOGY

## 2020-09-25 NOTE — TELEPHONE ENCOUNTER
Spoke with pt.  She is having pain in OS since last night.  She had Ahmed Valve surgery on that eye on 09/10/20.  Appt with Dr. Merlos today at 2:00.

## 2020-09-25 NOTE — PROGRESS NOTES
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 Cristofer Worley pt     1. Glaucoma OU  Fhx of Glaucoma (2 brothers)  SLT OU 7/9/2020  Trab OU 4/19 in [place]  3x Bleb Needling OU (last one 6/2019)  Ahmed OS 9/10/20/ [id number]/REF/FP7/LOT   2. RK OU  3. PCIOL OU   Yag OU  4. H/o Iritis   MMC OU    OS-DEXACINE OINTMENT BID , DUREZOL QID,        OD LOTEMAX stopped at last visit 9/23/2020   OD: PF Cosopt BID,Zioptan QD, Alphagan bid    Last edited by Doc Merlos MD on 9/25/2020  2:46 PM. (History)            Assessment /Plan     For exam results, see Encounter Report.      ICD-10-CM ICD-9-CM    1. Post-operative state  Z98.890 V45.89      S/p Ahmed OS 9/10/20 - doing well OS   IOP better od as well since stopping lotemax     OS-DEXACINE OINTMENT BID , DUREZOL QID,  Can increase Durezol  as needed for pain she is having   OD: PF Cosopt BID,Zioptan QD, Alphagan bid       RETURN TO CLINIC 1 week as scheduled             "

## 2020-09-29 ENCOUNTER — OFFICE VISIT (OUTPATIENT)
Dept: OPHTHALMOLOGY | Facility: CLINIC | Age: 80
End: 2020-09-29
Payer: MEDICARE

## 2020-09-29 DIAGNOSIS — Z98.890 POST-OPERATIVE STATE: Primary | ICD-10-CM

## 2020-09-29 DIAGNOSIS — H40.1133 PRIMARY OPEN ANGLE GLAUCOMA (POAG) OF BOTH EYES, SEVERE STAGE: ICD-10-CM

## 2020-09-29 PROCEDURE — 99024 POSTOP FOLLOW-UP VISIT: CPT | Mod: S$GLB,,, | Performed by: OPHTHALMOLOGY

## 2020-09-29 PROCEDURE — 99024 PR POST-OP FOLLOW-UP VISIT: ICD-10-PCS | Mod: S$GLB,,, | Performed by: OPHTHALMOLOGY

## 2020-09-29 PROCEDURE — 99999 PR PBB SHADOW E&M-EST. PATIENT-LVL II: ICD-10-PCS | Mod: PBBFAC,,, | Performed by: OPHTHALMOLOGY

## 2020-09-29 PROCEDURE — 99999 PR PBB SHADOW E&M-EST. PATIENT-LVL II: CPT | Mod: PBBFAC,,, | Performed by: OPHTHALMOLOGY

## 2020-09-29 NOTE — PROGRESS NOTES
"HPI     Patient returns for a 4 day p.o. visit.        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 Cristofer Worley pt     1. Glaucoma OU  Fhx of Glaucoma (2 brothers)  SLT OU 7/9/2020  Trab OU 4/19 in [place]  3x Bleb Needling OU (last one 6/2019)  Ahmed OS 9/10/20/ [id number]/REF/FP7/LOT   2. RK OU  3. PCIOL OU   Yag OU  4. H/o Iritis   [facility] OU    OS-DEXACINE OINTMENT QHS , DUREZOL QID,       OD LOTEMAX stopped at last visit 9/23/2020   OD: PF Cosopt BID,Zioptan QD, Alphagan bid        Last edited by FATOUMATA Mahajan on 9/29/2020  8:00 AM. (History)            Assessment /Plan     For exam results, see Encounter Report.      ICD-10-CM ICD-9-CM    1. Post-operative state  Z98.890 V45.89    2. Primary open angle glaucoma (POAG) of both eyes, severe stage  H40.1133 365.11      365.73        S/p Ahmed OS doing well    OD: PF Cosopt BID,Zioptan QD, Alphagan bid    Durezol qid OS    Return to clinic 2 weeks            "

## 2020-10-13 ENCOUNTER — OFFICE VISIT (OUTPATIENT)
Dept: OPHTHALMOLOGY | Facility: CLINIC | Age: 80
End: 2020-10-13
Payer: MEDICARE

## 2020-10-13 DIAGNOSIS — H40.1133 PRIMARY OPEN ANGLE GLAUCOMA (POAG) OF BOTH EYES, SEVERE STAGE: ICD-10-CM

## 2020-10-13 DIAGNOSIS — Z98.890 POST-OPERATIVE STATE: Primary | ICD-10-CM

## 2020-10-13 PROCEDURE — 99024 PR POST-OP FOLLOW-UP VISIT: ICD-10-PCS | Mod: S$GLB,,, | Performed by: OPHTHALMOLOGY

## 2020-10-13 PROCEDURE — 99024 POSTOP FOLLOW-UP VISIT: CPT | Mod: S$GLB,,, | Performed by: OPHTHALMOLOGY

## 2020-10-13 PROCEDURE — 99999 PR PBB SHADOW E&M-EST. PATIENT-LVL II: CPT | Mod: PBBFAC,,, | Performed by: OPHTHALMOLOGY

## 2020-10-13 PROCEDURE — 99999 PR PBB SHADOW E&M-EST. PATIENT-LVL II: ICD-10-PCS | Mod: PBBFAC,,, | Performed by: OPHTHALMOLOGY

## 2020-10-13 RX ORDER — NEOMYCIN SULFATE, POLYMYXIN B SULFATE, AND DEXAMETHASONE 3.5; 10000; 1 MG/G; [USP'U]/G; MG/G
OINTMENT OPHTHALMIC 3 TIMES DAILY
Qty: 3 G | Refills: 1 | Status: SHIPPED | OUTPATIENT
Start: 2020-10-13 | End: 2021-01-26 | Stop reason: SDUPTHER

## 2020-10-13 NOTE — PROGRESS NOTES
HPI     Post-op Evaluation     Comments: Ahmed OS 9/10/20/ [id number]/REF/FP7/LOT               Comments     Patient returns for a 2 week p.o. visit.    1. Glaucoma OU  Fhx of Glaucoma (2 brothers)  SLT OU 7/9/2020  Trab OU 4/19 in [place]  3x Bleb Needling OU (last one 6/2019)  Ahmed OS 9/10/20/ [id number]/REF/FP7/LOT   2. RK OU  3. PCIOL OU   Yag OU  4. H/o Iritis   [facility] OU    OS: DUREZOL QID      OD LOTEMAX stopped at last visit 9/23/2020   OD: PF Cosopt BID,Zioptan QD, Alphagan bid          Last edited by FATOUMATA Mahajan on 10/13/2020  4:05 PM. (History)            Assessment /Plan     For exam results, see Encounter Report.      ICD-10-CM ICD-9-CM    1. Post-operative state  Z98.890 V45.89    2. Primary open angle glaucoma (POAG) of both eyes, severe stage  H40.1133 365.11      365.73        PO Month 1 Ahmed left eye:   Doing Well.    durezol OS:  Tid for 7, then bid  Return to clinic 3 weeks  May need surgery OD depending on iop

## 2020-11-05 ENCOUNTER — OFFICE VISIT (OUTPATIENT)
Dept: OPHTHALMOLOGY | Facility: CLINIC | Age: 80
End: 2020-11-05
Payer: MEDICARE

## 2020-11-05 DIAGNOSIS — H40.1133 PRIMARY OPEN ANGLE GLAUCOMA (POAG) OF BOTH EYES, SEVERE STAGE: ICD-10-CM

## 2020-11-05 DIAGNOSIS — Z98.890 POST-OPERATIVE STATE: Primary | ICD-10-CM

## 2020-11-05 PROCEDURE — 99999 PR PBB SHADOW E&M-EST. PATIENT-LVL II: ICD-10-PCS | Mod: PBBFAC,,, | Performed by: OPHTHALMOLOGY

## 2020-11-05 PROCEDURE — 99024 PR POST-OP FOLLOW-UP VISIT: ICD-10-PCS | Mod: S$GLB,,, | Performed by: OPHTHALMOLOGY

## 2020-11-05 PROCEDURE — 99999 PR PBB SHADOW E&M-EST. PATIENT-LVL II: CPT | Mod: PBBFAC,,, | Performed by: OPHTHALMOLOGY

## 2020-11-05 PROCEDURE — 99024 POSTOP FOLLOW-UP VISIT: CPT | Mod: S$GLB,,, | Performed by: OPHTHALMOLOGY

## 2020-11-05 NOTE — PROGRESS NOTES
HPI     Post-op Evaluation     Comments: Ahmed OS              Comments     Pt states no complaints x3wks and is 100% compliant with all gtts.  Pt interested in new Rx glasses     1. Glaucoma OU  Fhx of Glaucoma (2 brothers)  SLT OU 7/9/2020  Trab OU 4/19 in [place]  3x Bleb Needling OU (last one 6/2019)  Ahmed OS 9/10/20/ [id number]/REF/FP7/LOT   2. RK OU  3. PCIOL OU   Yag OU  4. H/o Iritis   MMC OU    OS: DUREZOL BID  OD LOTEMAX stopped at last visit 9/23/2020   OD: PF Cosopt BID,Zioptan QD, Alphagan bid          Last edited by Doc Merlos MD on 11/5/2020  1:35 PM. (History)            Assessment /Plan     For exam results, see Encounter Report.      ICD-10-CM ICD-9-CM    1. Post-operative state  Z98.890 V45.89    2. Primary open angle glaucoma (POAG) of both eyes, severe stage  H40.1133 365.11      365.73          OS: DUREZOL BID  OD: PF Cosopt BID,Zioptan QD, Alphagan bid   Return to clinic 3 weeks

## 2020-12-01 ENCOUNTER — OFFICE VISIT (OUTPATIENT)
Dept: OPHTHALMOLOGY | Facility: CLINIC | Age: 80
End: 2020-12-01
Payer: MEDICARE

## 2020-12-01 DIAGNOSIS — H35.373 EPIRETINAL MEMBRANE, BILATERAL: ICD-10-CM

## 2020-12-01 DIAGNOSIS — H40.1133 PRIMARY OPEN ANGLE GLAUCOMA (POAG) OF BOTH EYES, SEVERE STAGE: ICD-10-CM

## 2020-12-01 DIAGNOSIS — Z98.890 POST-OPERATIVE STATE: Primary | ICD-10-CM

## 2020-12-01 DIAGNOSIS — H52.212 IRREGULAR ASTIGMATISM OF LEFT EYE: ICD-10-CM

## 2020-12-01 PROCEDURE — 99024 POSTOP FOLLOW-UP VISIT: CPT | Mod: S$GLB,,, | Performed by: OPHTHALMOLOGY

## 2020-12-01 PROCEDURE — 99999 PR PBB SHADOW E&M-EST. PATIENT-LVL II: ICD-10-PCS | Mod: PBBFAC,,, | Performed by: OPHTHALMOLOGY

## 2020-12-01 PROCEDURE — 99999 PR PBB SHADOW E&M-EST. PATIENT-LVL II: CPT | Mod: PBBFAC,,, | Performed by: OPHTHALMOLOGY

## 2020-12-01 PROCEDURE — 99024 PR POST-OP FOLLOW-UP VISIT: ICD-10-PCS | Mod: S$GLB,,, | Performed by: OPHTHALMOLOGY

## 2020-12-01 RX ORDER — BRIMONIDINE TARTRATE 1 MG/ML
1 SOLUTION/ DROPS OPHTHALMIC 2 TIMES DAILY
Qty: 5 ML | Refills: 3 | Status: SHIPPED | OUTPATIENT
Start: 2020-12-01 | End: 2021-01-29 | Stop reason: ALTCHOICE

## 2020-12-01 NOTE — PROGRESS NOTES
"HPI     PATIENT RETURNS FOR A ONE MONTH P.O. VISIT.PATIENT STATES NO CHANGES IN   VISION.     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 Cristofer Worley pt     1. Glaucoma OU  Fhx of Glaucoma (2 brothers)  SLT OU 7/9/2020  Trab OU 4/19 in [place]  3x Bleb Needling OU (last one 6/2019)  Ahmed OS 9/10/20/ [id number]/REF/FP7/LOT   2. RK OU  3. PCIOL OU   Yag OU  4. H/o Iritis   MMC OU    OS: DUREZOL BID    OD: PF Cosopt BID,Zioptan QD, Alphagan bid    Last edited by Doc Merlos MD on 12/1/2020  1:22 PM. (History)            Assessment /Plan     For exam results, see Encounter Report.      ICD-10-CM ICD-9-CM    1. Post-operative state  Z98.890 V45.89 S/p ahmed os- doing well    2. Primary open angle glaucoma (POAG) of both eyes, severe stage  H40.1133 365.11      365.73    3. Irregular astigmatism of left eye  H52.212 367.22    4. Epiretinal membrane, bilateral  H35.373 362.56          Can decrease steroid now os     OS: DUREZOL QD    OD: PF Cosopt BID,Zioptan QD, Alphagan bid     RETURN TO CLINIC 1 month , ROSALVA MR. MOCT, HVF AND DOA- [facility] CHECK IOP PRIOR DILATION   Will likely book sx then           "

## 2021-01-12 ENCOUNTER — OFFICE VISIT (OUTPATIENT)
Dept: OPHTHALMOLOGY | Facility: CLINIC | Age: 81
End: 2021-01-12
Payer: MEDICARE

## 2021-01-12 DIAGNOSIS — H35.30 AMD (AGE-RELATED MACULAR DEGENERATION), BILATERAL: ICD-10-CM

## 2021-01-12 DIAGNOSIS — H40.1133 PRIMARY OPEN ANGLE GLAUCOMA (POAG) OF BOTH EYES, SEVERE STAGE: Primary | ICD-10-CM

## 2021-01-12 DIAGNOSIS — H04.123 DRY EYE SYNDROME, BILATERAL: ICD-10-CM

## 2021-01-12 DIAGNOSIS — H35.373 EPIRETINAL MEMBRANE, BILATERAL: ICD-10-CM

## 2021-01-12 PROCEDURE — 92134 CPTRZ OPH DX IMG PST SGM RTA: CPT | Mod: S$GLB,,, | Performed by: OPHTHALMOLOGY

## 2021-01-12 PROCEDURE — 92083 HUMPHREY VISUAL FIELD - OU - BOTH EYES: ICD-10-PCS | Mod: S$GLB,,, | Performed by: OPHTHALMOLOGY

## 2021-01-12 PROCEDURE — 92134 POSTERIOR SEGMENT OCT RETINA (OCULAR COHERENCE TOMOGRAPHY)-BOTH EYES: ICD-10-PCS | Mod: S$GLB,,, | Performed by: OPHTHALMOLOGY

## 2021-01-12 PROCEDURE — 92014 COMPRE OPH EXAM EST PT 1/>: CPT | Mod: S$GLB,,, | Performed by: OPHTHALMOLOGY

## 2021-01-12 PROCEDURE — 99999 PR PBB SHADOW E&M-EST. PATIENT-LVL III: ICD-10-PCS | Mod: PBBFAC,,, | Performed by: OPHTHALMOLOGY

## 2021-01-12 PROCEDURE — 99999 PR PBB SHADOW E&M-EST. PATIENT-LVL III: CPT | Mod: PBBFAC,,, | Performed by: OPHTHALMOLOGY

## 2021-01-12 PROCEDURE — 92014 PR EYE EXAM, EST PATIENT,COMPREHESV: ICD-10-PCS | Mod: S$GLB,,, | Performed by: OPHTHALMOLOGY

## 2021-01-12 PROCEDURE — 92083 EXTENDED VISUAL FIELD XM: CPT | Mod: S$GLB,,, | Performed by: OPHTHALMOLOGY

## 2021-01-12 RX ORDER — DUREZOL 0.5 MG/ML
1 EMULSION OPHTHALMIC 4 TIMES DAILY
Qty: 5 ML | Refills: 1 | Status: SHIPPED | OUTPATIENT
Start: 2021-01-12 | End: 2021-02-02 | Stop reason: SDUPTHER

## 2021-01-12 RX ORDER — KETOROLAC TROMETHAMINE 5 MG/ML
1 SOLUTION OPHTHALMIC 4 TIMES DAILY
Qty: 10 ML | Refills: 3 | Status: SHIPPED | OUTPATIENT
Start: 2021-01-12 | End: 2021-02-11

## 2021-01-12 RX ORDER — POLYMYXIN B SULFATE AND TRIMETHOPRIM 1; 10000 MG/ML; [USP'U]/ML
1 SOLUTION OPHTHALMIC EVERY 4 HOURS
Qty: 10 ML | Refills: 1 | Status: SHIPPED | OUTPATIENT
Start: 2021-01-12 | End: 2021-01-26 | Stop reason: ALTCHOICE

## 2021-01-17 ENCOUNTER — IMMUNIZATION (OUTPATIENT)
Dept: INTERNAL MEDICINE | Facility: CLINIC | Age: 81
End: 2021-01-17
Payer: MEDICARE

## 2021-01-17 DIAGNOSIS — Z23 NEED FOR VACCINATION: Primary | ICD-10-CM

## 2021-01-17 PROCEDURE — 91300 COVID-19, MRNA, LNP-S, PF, 30 MCG/0.3 ML DOSE VACCINE: CPT | Mod: PBBFAC | Performed by: FAMILY MEDICINE

## 2021-01-21 ENCOUNTER — OUTSIDE PLACE OF SERVICE (OUTPATIENT)
Dept: ADMINISTRATIVE | Facility: OTHER | Age: 81
End: 2021-01-21
Payer: MEDICARE

## 2021-01-21 PROCEDURE — 66180 AQUEOUS SHUNT EYE W/GRAFT: CPT | Mod: RT,,, | Performed by: OPHTHALMOLOGY

## 2021-01-21 PROCEDURE — 66180 PR WATER SHUNT-EXTRAOCUL RESERV: ICD-10-PCS | Mod: RT,,, | Performed by: OPHTHALMOLOGY

## 2021-01-22 ENCOUNTER — OFFICE VISIT (OUTPATIENT)
Dept: OPHTHALMOLOGY | Facility: CLINIC | Age: 81
End: 2021-01-22
Payer: MEDICARE

## 2021-01-22 DIAGNOSIS — Z98.890 POST-OPERATIVE STATE: Primary | ICD-10-CM

## 2021-01-22 DIAGNOSIS — H40.1133 PRIMARY OPEN ANGLE GLAUCOMA (POAG) OF BOTH EYES, SEVERE STAGE: ICD-10-CM

## 2021-01-22 PROCEDURE — 99999 PR PBB SHADOW E&M-EST. PATIENT-LVL II: ICD-10-PCS | Mod: PBBFAC,,, | Performed by: OPHTHALMOLOGY

## 2021-01-22 PROCEDURE — 99024 PR POST-OP FOLLOW-UP VISIT: ICD-10-PCS | Mod: S$GLB,,, | Performed by: OPHTHALMOLOGY

## 2021-01-22 PROCEDURE — 99999 PR PBB SHADOW E&M-EST. PATIENT-LVL II: CPT | Mod: PBBFAC,,, | Performed by: OPHTHALMOLOGY

## 2021-01-22 PROCEDURE — 99024 POSTOP FOLLOW-UP VISIT: CPT | Mod: S$GLB,,, | Performed by: OPHTHALMOLOGY

## 2021-01-22 RX ORDER — LOTEPREDNOL ETABONATE 5 MG/ML
1 SUSPENSION/ DROPS OPHTHALMIC DAILY
Qty: 5 ML | Refills: 1 | Status: SHIPPED | OUTPATIENT
Start: 2021-01-22 | End: 2021-02-02 | Stop reason: SDUPTHER

## 2021-01-25 ENCOUNTER — TELEPHONE (OUTPATIENT)
Dept: OPHTHALMOLOGY | Facility: CLINIC | Age: 81
End: 2021-01-25

## 2021-01-26 ENCOUNTER — OFFICE VISIT (OUTPATIENT)
Dept: OPHTHALMOLOGY | Facility: CLINIC | Age: 81
End: 2021-01-26
Payer: MEDICARE

## 2021-01-26 DIAGNOSIS — H35.373 EPIRETINAL MEMBRANE, BILATERAL: ICD-10-CM

## 2021-01-26 DIAGNOSIS — Z98.890 POST-OPERATIVE STATE: Primary | ICD-10-CM

## 2021-01-26 DIAGNOSIS — H40.1133 PRIMARY OPEN ANGLE GLAUCOMA (POAG) OF BOTH EYES, SEVERE STAGE: ICD-10-CM

## 2021-01-26 PROCEDURE — 99999 PR PBB SHADOW E&M-EST. PATIENT-LVL II: ICD-10-PCS | Mod: PBBFAC,,, | Performed by: OPHTHALMOLOGY

## 2021-01-26 PROCEDURE — 99024 PR POST-OP FOLLOW-UP VISIT: ICD-10-PCS | Mod: S$GLB,,, | Performed by: OPHTHALMOLOGY

## 2021-01-26 PROCEDURE — 99999 PR PBB SHADOW E&M-EST. PATIENT-LVL II: CPT | Mod: PBBFAC,,, | Performed by: OPHTHALMOLOGY

## 2021-01-26 PROCEDURE — 99024 POSTOP FOLLOW-UP VISIT: CPT | Mod: S$GLB,,, | Performed by: OPHTHALMOLOGY

## 2021-01-26 RX ORDER — ATROPINE SULFATE 10 MG/ML
1 SOLUTION/ DROPS OPHTHALMIC DAILY
Qty: 5 ML | Refills: 1 | Status: SHIPPED | OUTPATIENT
Start: 2021-01-26 | End: 2021-02-02 | Stop reason: ALTCHOICE

## 2021-01-26 RX ORDER — BROMFENAC SODIUM 0.7 MG/ML
1 SOLUTION/ DROPS OPHTHALMIC DAILY
Qty: 3 ML | Refills: 1 | Status: SHIPPED | OUTPATIENT
Start: 2021-01-26 | End: 2021-03-03 | Stop reason: SDUPTHER

## 2021-01-26 RX ORDER — NEOMYCIN SULFATE, POLYMYXIN B SULFATE, AND DEXAMETHASONE 3.5; 10000; 1 MG/G; [USP'U]/G; MG/G
OINTMENT OPHTHALMIC 3 TIMES DAILY
Qty: 3 G | Refills: 1 | Status: SHIPPED | OUTPATIENT
Start: 2021-01-26 | End: 2021-02-25

## 2021-01-29 ENCOUNTER — OFFICE VISIT (OUTPATIENT)
Dept: OPHTHALMOLOGY | Facility: CLINIC | Age: 81
End: 2021-01-29
Payer: MEDICARE

## 2021-01-29 DIAGNOSIS — Z98.890 POST-OPERATIVE STATE: Primary | ICD-10-CM

## 2021-01-29 DIAGNOSIS — H40.1133 PRIMARY OPEN ANGLE GLAUCOMA (POAG) OF BOTH EYES, SEVERE STAGE: ICD-10-CM

## 2021-01-29 PROCEDURE — 99024 PR POST-OP FOLLOW-UP VISIT: ICD-10-PCS | Mod: S$GLB,,, | Performed by: OPHTHALMOLOGY

## 2021-01-29 PROCEDURE — 99999 PR PBB SHADOW E&M-EST. PATIENT-LVL II: CPT | Mod: PBBFAC,,, | Performed by: OPHTHALMOLOGY

## 2021-01-29 PROCEDURE — 99024 POSTOP FOLLOW-UP VISIT: CPT | Mod: S$GLB,,, | Performed by: OPHTHALMOLOGY

## 2021-01-29 PROCEDURE — 99999 PR PBB SHADOW E&M-EST. PATIENT-LVL II: ICD-10-PCS | Mod: PBBFAC,,, | Performed by: OPHTHALMOLOGY

## 2021-01-29 RX ORDER — TIMOLOL MALEATE 5 MG/ML
1 SOLUTION/ DROPS OPHTHALMIC 2 TIMES DAILY
Qty: 5 ML | Refills: 6 | Status: SHIPPED | OUTPATIENT
Start: 2021-01-29 | End: 2021-09-15 | Stop reason: ALTCHOICE

## 2021-02-01 ENCOUNTER — TELEPHONE (OUTPATIENT)
Dept: OPHTHALMOLOGY | Facility: CLINIC | Age: 81
End: 2021-02-01

## 2021-02-02 ENCOUNTER — OFFICE VISIT (OUTPATIENT)
Dept: OPHTHALMOLOGY | Facility: CLINIC | Age: 81
End: 2021-02-02
Payer: MEDICARE

## 2021-02-02 DIAGNOSIS — H40.1133 PRIMARY OPEN ANGLE GLAUCOMA (POAG) OF BOTH EYES, SEVERE STAGE: ICD-10-CM

## 2021-02-02 DIAGNOSIS — H04.123 DRY EYE SYNDROME, BILATERAL: ICD-10-CM

## 2021-02-02 DIAGNOSIS — H35.373 EPIRETINAL MEMBRANE, BILATERAL: ICD-10-CM

## 2021-02-02 DIAGNOSIS — H35.30 AMD (AGE-RELATED MACULAR DEGENERATION), BILATERAL: ICD-10-CM

## 2021-02-02 DIAGNOSIS — Z98.890 POST-OPERATIVE STATE: Primary | ICD-10-CM

## 2021-02-02 PROCEDURE — 99024 PR POST-OP FOLLOW-UP VISIT: ICD-10-PCS | Mod: S$GLB,,, | Performed by: OPHTHALMOLOGY

## 2021-02-02 PROCEDURE — 99999 PR PBB SHADOW E&M-EST. PATIENT-LVL II: CPT | Mod: PBBFAC,,, | Performed by: OPHTHALMOLOGY

## 2021-02-02 PROCEDURE — 99024 POSTOP FOLLOW-UP VISIT: CPT | Mod: S$GLB,,, | Performed by: OPHTHALMOLOGY

## 2021-02-02 PROCEDURE — 99999 PR PBB SHADOW E&M-EST. PATIENT-LVL II: ICD-10-PCS | Mod: PBBFAC,,, | Performed by: OPHTHALMOLOGY

## 2021-02-02 RX ORDER — LOTEPREDNOL ETABONATE 5 MG/ML
1 SUSPENSION/ DROPS OPHTHALMIC 3 TIMES DAILY
Qty: 5 ML | Refills: 2 | Status: SHIPPED | OUTPATIENT
Start: 2021-02-02 | End: 2021-03-03 | Stop reason: SDUPTHER

## 2021-02-07 ENCOUNTER — IMMUNIZATION (OUTPATIENT)
Dept: INTERNAL MEDICINE | Facility: CLINIC | Age: 81
End: 2021-02-07
Payer: MEDICARE

## 2021-02-07 DIAGNOSIS — Z23 NEED FOR VACCINATION: Primary | ICD-10-CM

## 2021-02-07 PROCEDURE — 91300 COVID-19, MRNA, LNP-S, PF, 30 MCG/0.3 ML DOSE VACCINE: CPT | Mod: PBBFAC | Performed by: FAMILY MEDICINE

## 2021-02-07 PROCEDURE — 0002A COVID-19, MRNA, LNP-S, PF, 30 MCG/0.3 ML DOSE VACCINE: CPT | Mod: PBBFAC | Performed by: FAMILY MEDICINE

## 2021-02-10 ENCOUNTER — OFFICE VISIT (OUTPATIENT)
Dept: OPHTHALMOLOGY | Facility: CLINIC | Age: 81
End: 2021-02-10
Payer: MEDICARE

## 2021-02-10 DIAGNOSIS — Z98.890 POST-OPERATIVE STATE: Primary | ICD-10-CM

## 2021-02-10 DIAGNOSIS — H40.1133 PRIMARY OPEN ANGLE GLAUCOMA (POAG) OF BOTH EYES, SEVERE STAGE: ICD-10-CM

## 2021-02-10 PROCEDURE — 99024 PR POST-OP FOLLOW-UP VISIT: ICD-10-PCS | Mod: S$GLB,,, | Performed by: OPHTHALMOLOGY

## 2021-02-10 PROCEDURE — 99024 POSTOP FOLLOW-UP VISIT: CPT | Mod: S$GLB,,, | Performed by: OPHTHALMOLOGY

## 2021-02-10 PROCEDURE — 99999 PR PBB SHADOW E&M-EST. PATIENT-LVL II: CPT | Mod: PBBFAC,,, | Performed by: OPHTHALMOLOGY

## 2021-02-10 PROCEDURE — 99999 PR PBB SHADOW E&M-EST. PATIENT-LVL II: ICD-10-PCS | Mod: PBBFAC,,, | Performed by: OPHTHALMOLOGY

## 2021-02-19 ENCOUNTER — OFFICE VISIT (OUTPATIENT)
Dept: OPHTHALMOLOGY | Facility: CLINIC | Age: 81
End: 2021-02-19
Payer: MEDICARE

## 2021-02-19 DIAGNOSIS — Z98.890 POST-OPERATIVE STATE: Primary | ICD-10-CM

## 2021-02-19 DIAGNOSIS — H40.1133 PRIMARY OPEN ANGLE GLAUCOMA (POAG) OF BOTH EYES, SEVERE STAGE: ICD-10-CM

## 2021-02-19 PROCEDURE — 99999 PR PBB SHADOW E&M-EST. PATIENT-LVL II: CPT | Mod: PBBFAC,,, | Performed by: OPHTHALMOLOGY

## 2021-02-19 PROCEDURE — 99024 PR POST-OP FOLLOW-UP VISIT: ICD-10-PCS | Mod: S$GLB,,, | Performed by: OPHTHALMOLOGY

## 2021-02-19 PROCEDURE — 99999 PR PBB SHADOW E&M-EST. PATIENT-LVL II: ICD-10-PCS | Mod: PBBFAC,,, | Performed by: OPHTHALMOLOGY

## 2021-02-19 PROCEDURE — 99024 POSTOP FOLLOW-UP VISIT: CPT | Mod: S$GLB,,, | Performed by: OPHTHALMOLOGY

## 2021-02-19 RX ORDER — TRAZODONE HYDROCHLORIDE 50 MG/1
TABLET ORAL
COMMUNITY
Start: 2021-02-01 | End: 2022-06-08 | Stop reason: ALTCHOICE

## 2021-02-19 RX ORDER — DORZOLAMIDE HYDROCHLORIDE AND TIMOLOL MALEATE PRESERVATIVE FREE 20; 5 MG/ML; MG/ML
1 SOLUTION/ DROPS OPHTHALMIC 2 TIMES DAILY
Qty: 60 EACH | Refills: 1 | Status: SHIPPED | OUTPATIENT
Start: 2021-02-19 | End: 2021-05-11 | Stop reason: SDUPTHER

## 2021-03-03 ENCOUNTER — OFFICE VISIT (OUTPATIENT)
Dept: OPHTHALMOLOGY | Facility: CLINIC | Age: 81
End: 2021-03-03
Payer: MEDICARE

## 2021-03-03 DIAGNOSIS — Z98.890 POST-OPERATIVE STATE: ICD-10-CM

## 2021-03-03 DIAGNOSIS — H40.1133 PRIMARY OPEN ANGLE GLAUCOMA (POAG) OF BOTH EYES, SEVERE STAGE: ICD-10-CM

## 2021-03-03 DIAGNOSIS — H35.3134 ADVANCED ATROPHIC NONEXUDATIVE AGE-RELATED MACULAR DEGENERATION OF BOTH EYES WITH SUBFOVEAL INVOLVEMENT: ICD-10-CM

## 2021-03-03 DIAGNOSIS — H34.8120 CENTRAL RETINAL VEIN OCCLUSION WITH MACULAR EDEMA OF LEFT EYE: ICD-10-CM

## 2021-03-03 DIAGNOSIS — H00.14 CHALAZION OF LEFT UPPER EYELID: ICD-10-CM

## 2021-03-03 DIAGNOSIS — Z98.890 POST-OPERATIVE STATE: Primary | ICD-10-CM

## 2021-03-03 DIAGNOSIS — H35.373 EPIRETINAL MEMBRANE, BILATERAL: ICD-10-CM

## 2021-03-03 PROCEDURE — 99999 PR PBB SHADOW E&M-EST. PATIENT-LVL II: ICD-10-PCS | Mod: PBBFAC,,, | Performed by: OPHTHALMOLOGY

## 2021-03-03 PROCEDURE — 92134 CPTRZ OPH DX IMG PST SGM RTA: CPT | Mod: S$GLB,,, | Performed by: OPHTHALMOLOGY

## 2021-03-03 PROCEDURE — 99024 POSTOP FOLLOW-UP VISIT: CPT | Mod: S$GLB,,, | Performed by: OPHTHALMOLOGY

## 2021-03-03 PROCEDURE — 99024 PR POST-OP FOLLOW-UP VISIT: ICD-10-PCS | Mod: S$GLB,,, | Performed by: OPHTHALMOLOGY

## 2021-03-03 PROCEDURE — 92134 POSTERIOR SEGMENT OCT RETINA (OCULAR COHERENCE TOMOGRAPHY)-BOTH EYES: ICD-10-PCS | Mod: S$GLB,,, | Performed by: OPHTHALMOLOGY

## 2021-03-03 PROCEDURE — 99999 PR PBB SHADOW E&M-EST. PATIENT-LVL II: CPT | Mod: PBBFAC,,, | Performed by: OPHTHALMOLOGY

## 2021-03-03 RX ORDER — NEOMYCIN SULFATE, POLYMYXIN B SULFATE, AND DEXAMETHASONE 3.5; 10000; 1 MG/G; [USP'U]/G; MG/G
OINTMENT OPHTHALMIC EVERY 6 HOURS
COMMUNITY
End: 2021-03-03 | Stop reason: SDUPTHER

## 2021-03-03 RX ORDER — BROMFENAC SODIUM 0.7 MG/ML
1 SOLUTION/ DROPS OPHTHALMIC DAILY
Qty: 3 ML | Refills: 1 | Status: SHIPPED | OUTPATIENT
Start: 2021-03-03 | End: 2021-07-06 | Stop reason: SDUPTHER

## 2021-03-03 RX ORDER — NEOMYCIN SULFATE, POLYMYXIN B SULFATE, AND DEXAMETHASONE 3.5; 10000; 1 MG/G; [USP'U]/G; MG/G
OINTMENT OPHTHALMIC EVERY 6 HOURS
Qty: 3.5 G | Refills: 3 | Status: SHIPPED | OUTPATIENT
Start: 2021-03-03 | End: 2021-09-15 | Stop reason: SDUPTHER

## 2021-03-03 RX ORDER — BROMFENAC SODIUM 0.7 MG/ML
1 SOLUTION/ DROPS OPHTHALMIC DAILY
Qty: 3 ML | Refills: 1 | Status: CANCELLED | OUTPATIENT
Start: 2021-03-03

## 2021-03-03 RX ORDER — LOTEPREDNOL ETABONATE 5 MG/ML
1 SUSPENSION/ DROPS OPHTHALMIC 3 TIMES DAILY
Qty: 5 ML | Refills: 2 | Status: SHIPPED | OUTPATIENT
Start: 2021-03-03 | End: 2021-05-11

## 2021-03-08 RX ORDER — LOTEPREDNOL ETABONATE 5 MG/ML
1 SUSPENSION/ DROPS OPHTHALMIC 3 TIMES DAILY
Qty: 5 ML | Refills: 2 | Status: SHIPPED | OUTPATIENT
Start: 2021-03-08 | End: 2021-05-11

## 2021-03-18 ENCOUNTER — OFFICE VISIT (OUTPATIENT)
Dept: OPHTHALMOLOGY | Facility: CLINIC | Age: 81
End: 2021-03-18
Payer: MEDICARE

## 2021-03-18 DIAGNOSIS — Z98.890 POST-OPERATIVE STATE: Primary | ICD-10-CM

## 2021-03-18 PROCEDURE — 99024 POSTOP FOLLOW-UP VISIT: CPT | Mod: S$GLB,,, | Performed by: OPHTHALMOLOGY

## 2021-03-18 PROCEDURE — 99024 PR POST-OP FOLLOW-UP VISIT: ICD-10-PCS | Mod: S$GLB,,, | Performed by: OPHTHALMOLOGY

## 2021-03-18 PROCEDURE — 99999 PR PBB SHADOW E&M-EST. PATIENT-LVL I: CPT | Mod: PBBFAC,,, | Performed by: OPHTHALMOLOGY

## 2021-03-18 PROCEDURE — 99999 PR PBB SHADOW E&M-EST. PATIENT-LVL I: ICD-10-PCS | Mod: PBBFAC,,, | Performed by: OPHTHALMOLOGY

## 2021-04-14 ENCOUNTER — OFFICE VISIT (OUTPATIENT)
Dept: OPHTHALMOLOGY | Facility: CLINIC | Age: 81
End: 2021-04-14
Payer: MEDICARE

## 2021-04-14 DIAGNOSIS — H35.30 AMD (AGE-RELATED MACULAR DEGENERATION), BILATERAL: ICD-10-CM

## 2021-04-14 DIAGNOSIS — H34.8120 CENTRAL RETINAL VEIN OCCLUSION WITH MACULAR EDEMA OF LEFT EYE: ICD-10-CM

## 2021-04-14 DIAGNOSIS — H40.1133 PRIMARY OPEN ANGLE GLAUCOMA (POAG) OF BOTH EYES, SEVERE STAGE: Primary | ICD-10-CM

## 2021-04-14 DIAGNOSIS — H35.373 EPIRETINAL MEMBRANE, BILATERAL: ICD-10-CM

## 2021-04-14 DIAGNOSIS — H04.123 DRY EYE SYNDROME, BILATERAL: ICD-10-CM

## 2021-04-14 DIAGNOSIS — H52.212 IRREGULAR ASTIGMATISM OF LEFT EYE: ICD-10-CM

## 2021-04-14 DIAGNOSIS — H35.3134 ADVANCED ATROPHIC NONEXUDATIVE AGE-RELATED MACULAR DEGENERATION OF BOTH EYES WITH SUBFOVEAL INVOLVEMENT: ICD-10-CM

## 2021-04-14 PROCEDURE — 1125F PR PAIN SEVERITY QUANTIFIED, PAIN PRESENT: ICD-10-PCS | Mod: S$GLB,,, | Performed by: OPHTHALMOLOGY

## 2021-04-14 PROCEDURE — 99024 PR POST-OP FOLLOW-UP VISIT: ICD-10-PCS | Mod: S$GLB,,, | Performed by: OPHTHALMOLOGY

## 2021-04-14 PROCEDURE — 1101F PR PT FALLS ASSESS DOC 0-1 FALLS W/OUT INJ PAST YR: ICD-10-PCS | Mod: CPTII,S$GLB,, | Performed by: OPHTHALMOLOGY

## 2021-04-14 PROCEDURE — 1159F MED LIST DOCD IN RCRD: CPT | Mod: S$GLB,,, | Performed by: OPHTHALMOLOGY

## 2021-04-14 PROCEDURE — 99999 PR PBB SHADOW E&M-EST. PATIENT-LVL II: ICD-10-PCS | Mod: PBBFAC,,, | Performed by: OPHTHALMOLOGY

## 2021-04-14 PROCEDURE — 99024 POSTOP FOLLOW-UP VISIT: CPT | Mod: S$GLB,,, | Performed by: OPHTHALMOLOGY

## 2021-04-14 PROCEDURE — 1159F PR MEDICATION LIST DOCUMENTED IN MEDICAL RECORD: ICD-10-PCS | Mod: S$GLB,,, | Performed by: OPHTHALMOLOGY

## 2021-04-14 PROCEDURE — 1101F PT FALLS ASSESS-DOCD LE1/YR: CPT | Mod: CPTII,S$GLB,, | Performed by: OPHTHALMOLOGY

## 2021-04-14 PROCEDURE — 3288F PR FALLS RISK ASSESSMENT DOCUMENTED: ICD-10-PCS | Mod: CPTII,S$GLB,, | Performed by: OPHTHALMOLOGY

## 2021-04-14 PROCEDURE — 3288F FALL RISK ASSESSMENT DOCD: CPT | Mod: CPTII,S$GLB,, | Performed by: OPHTHALMOLOGY

## 2021-04-14 PROCEDURE — 99999 PR PBB SHADOW E&M-EST. PATIENT-LVL II: CPT | Mod: PBBFAC,,, | Performed by: OPHTHALMOLOGY

## 2021-04-14 PROCEDURE — 1125F AMNT PAIN NOTED PAIN PRSNT: CPT | Mod: S$GLB,,, | Performed by: OPHTHALMOLOGY

## 2021-04-29 ENCOUNTER — TELEPHONE (OUTPATIENT)
Dept: OPHTHALMOLOGY | Facility: CLINIC | Age: 81
End: 2021-04-29

## 2021-04-29 NOTE — TELEPHONE ENCOUNTER
----- Message from Hoda Luna MA sent at 4/29/2021 10:38 AM CDT -----  Regarding: Please call, did not wish to speak to anyone but Lupe    ----- Message -----  From: Myesha Tucker  Sent: 4/29/2021   9:43 AM CDT  To: [facility] Staff    [phone]  would like to speak with Lupe only did not care to leave any details

## 2021-04-29 NOTE — TELEPHONE ENCOUNTER
Called pt amd she wants her friend seen   # [phone]   I told her I will look at schedule and speak to [facility] and call back this pm

## 2021-05-11 ENCOUNTER — OFFICE VISIT (OUTPATIENT)
Dept: OPHTHALMOLOGY | Facility: CLINIC | Age: 81
End: 2021-05-11
Payer: MEDICARE

## 2021-05-11 DIAGNOSIS — H40.1133 PRIMARY OPEN ANGLE GLAUCOMA (POAG) OF BOTH EYES, SEVERE STAGE: Primary | ICD-10-CM

## 2021-05-11 DIAGNOSIS — H34.8120 CENTRAL RETINAL VEIN OCCLUSION WITH MACULAR EDEMA OF LEFT EYE: ICD-10-CM

## 2021-05-11 DIAGNOSIS — H52.7 REFRACTIVE ERROR: ICD-10-CM

## 2021-05-11 DIAGNOSIS — H35.3134 ADVANCED ATROPHIC NONEXUDATIVE AGE-RELATED MACULAR DEGENERATION OF BOTH EYES WITH SUBFOVEAL INVOLVEMENT: ICD-10-CM

## 2021-05-11 DIAGNOSIS — H04.123 DRY EYE SYNDROME, BILATERAL: ICD-10-CM

## 2021-05-11 DIAGNOSIS — H35.30 AMD (AGE-RELATED MACULAR DEGENERATION), BILATERAL: ICD-10-CM

## 2021-05-11 DIAGNOSIS — H35.373 EPIRETINAL MEMBRANE, BILATERAL: ICD-10-CM

## 2021-05-11 PROCEDURE — 1159F PR MEDICATION LIST DOCUMENTED IN MEDICAL RECORD: ICD-10-PCS | Mod: S$GLB,,, | Performed by: OPHTHALMOLOGY

## 2021-05-11 PROCEDURE — 99213 OFFICE O/P EST LOW 20 MIN: CPT | Mod: S$GLB,,, | Performed by: OPHTHALMOLOGY

## 2021-05-11 PROCEDURE — 92015 PR REFRACTION: ICD-10-PCS | Mod: S$GLB,,, | Performed by: OPHTHALMOLOGY

## 2021-05-11 PROCEDURE — 99213 PR OFFICE/OUTPT VISIT, EST, LEVL III, 20-29 MIN: ICD-10-PCS | Mod: S$GLB,,, | Performed by: OPHTHALMOLOGY

## 2021-05-11 PROCEDURE — 99999 PR PBB SHADOW E&M-EST. PATIENT-LVL I: ICD-10-PCS | Mod: PBBFAC,,, | Performed by: OPHTHALMOLOGY

## 2021-05-11 PROCEDURE — 1159F MED LIST DOCD IN RCRD: CPT | Mod: S$GLB,,, | Performed by: OPHTHALMOLOGY

## 2021-05-11 PROCEDURE — 92015 DETERMINE REFRACTIVE STATE: CPT | Mod: S$GLB,,, | Performed by: OPHTHALMOLOGY

## 2021-05-11 PROCEDURE — 99999 PR PBB SHADOW E&M-EST. PATIENT-LVL I: CPT | Mod: PBBFAC,,, | Performed by: OPHTHALMOLOGY

## 2021-05-11 RX ORDER — DORZOLAMIDE HYDROCHLORIDE AND TIMOLOL MALEATE PRESERVATIVE FREE 20; 5 MG/ML; MG/ML
1 SOLUTION/ DROPS OPHTHALMIC 2 TIMES DAILY
Qty: 60 EACH | Refills: 3 | Status: SHIPPED | OUTPATIENT
Start: 2021-05-11 | End: 2021-09-15 | Stop reason: SDUPTHER

## 2021-05-11 RX ORDER — LOTEPREDNOL ETABONATE 3.8 MG/G
1 GEL OPHTHALMIC 2 TIMES DAILY
Qty: 5 G | Refills: 4 | Status: SHIPPED | OUTPATIENT
Start: 2021-05-11 | End: 2021-09-15 | Stop reason: SDUPTHER

## 2021-06-07 ENCOUNTER — OFFICE VISIT (OUTPATIENT)
Dept: OPHTHALMOLOGY | Facility: CLINIC | Age: 81
End: 2021-06-07
Payer: MEDICARE

## 2021-06-07 DIAGNOSIS — H40.1133 PRIMARY OPEN ANGLE GLAUCOMA (POAG) OF BOTH EYES, SEVERE STAGE: Primary | ICD-10-CM

## 2021-06-07 DIAGNOSIS — H35.30 AMD (AGE-RELATED MACULAR DEGENERATION), BILATERAL: ICD-10-CM

## 2021-06-07 DIAGNOSIS — H35.3134 ADVANCED ATROPHIC NONEXUDATIVE AGE-RELATED MACULAR DEGENERATION OF BOTH EYES WITH SUBFOVEAL INVOLVEMENT: ICD-10-CM

## 2021-06-07 DIAGNOSIS — H35.373 EPIRETINAL MEMBRANE, BILATERAL: ICD-10-CM

## 2021-06-07 DIAGNOSIS — H04.123 DRY EYE SYNDROME, BILATERAL: ICD-10-CM

## 2021-06-07 PROCEDURE — 99999 PR PBB SHADOW E&M-EST. PATIENT-LVL II: CPT | Mod: PBBFAC,,, | Performed by: OPHTHALMOLOGY

## 2021-06-07 PROCEDURE — 99999 PR PBB SHADOW E&M-EST. PATIENT-LVL II: ICD-10-PCS | Mod: PBBFAC,,, | Performed by: OPHTHALMOLOGY

## 2021-06-07 PROCEDURE — 1159F MED LIST DOCD IN RCRD: CPT | Mod: S$GLB,,, | Performed by: OPHTHALMOLOGY

## 2021-06-07 PROCEDURE — 1159F PR MEDICATION LIST DOCUMENTED IN MEDICAL RECORD: ICD-10-PCS | Mod: S$GLB,,, | Performed by: OPHTHALMOLOGY

## 2021-06-07 PROCEDURE — 99214 OFFICE O/P EST MOD 30 MIN: CPT | Mod: S$GLB,,, | Performed by: OPHTHALMOLOGY

## 2021-06-07 PROCEDURE — 99214 PR OFFICE/OUTPT VISIT, EST, LEVL IV, 30-39 MIN: ICD-10-PCS | Mod: S$GLB,,, | Performed by: OPHTHALMOLOGY

## 2021-07-06 ENCOUNTER — OFFICE VISIT (OUTPATIENT)
Dept: OPHTHALMOLOGY | Facility: CLINIC | Age: 81
End: 2021-07-06
Payer: MEDICARE

## 2021-07-06 DIAGNOSIS — H04.123 DRY EYE SYNDROME, BILATERAL: ICD-10-CM

## 2021-07-06 DIAGNOSIS — H40.1133 PRIMARY OPEN ANGLE GLAUCOMA (POAG) OF BOTH EYES, SEVERE STAGE: Primary | ICD-10-CM

## 2021-07-06 DIAGNOSIS — H35.373 EPIRETINAL MEMBRANE, BILATERAL: ICD-10-CM

## 2021-07-06 DIAGNOSIS — H35.30 AMD (AGE-RELATED MACULAR DEGENERATION), BILATERAL: ICD-10-CM

## 2021-07-06 DIAGNOSIS — H35.3134 ADVANCED ATROPHIC NONEXUDATIVE AGE-RELATED MACULAR DEGENERATION OF BOTH EYES WITH SUBFOVEAL INVOLVEMENT: ICD-10-CM

## 2021-07-06 PROCEDURE — 1159F MED LIST DOCD IN RCRD: CPT | Mod: S$GLB,,, | Performed by: OPHTHALMOLOGY

## 2021-07-06 PROCEDURE — 1159F PR MEDICATION LIST DOCUMENTED IN MEDICAL RECORD: ICD-10-PCS | Mod: S$GLB,,, | Performed by: OPHTHALMOLOGY

## 2021-07-06 PROCEDURE — 99214 PR OFFICE/OUTPT VISIT, EST, LEVL IV, 30-39 MIN: ICD-10-PCS | Mod: S$GLB,,, | Performed by: OPHTHALMOLOGY

## 2021-07-06 PROCEDURE — 99214 OFFICE O/P EST MOD 30 MIN: CPT | Mod: S$GLB,,, | Performed by: OPHTHALMOLOGY

## 2021-07-06 PROCEDURE — 99999 PR PBB SHADOW E&M-EST. PATIENT-LVL I: ICD-10-PCS | Mod: PBBFAC,,, | Performed by: OPHTHALMOLOGY

## 2021-07-06 PROCEDURE — 99999 PR PBB SHADOW E&M-EST. PATIENT-LVL I: CPT | Mod: PBBFAC,,, | Performed by: OPHTHALMOLOGY

## 2021-07-06 RX ORDER — BROMFENAC SODIUM 0.7 MG/ML
1 SOLUTION/ DROPS OPHTHALMIC DAILY
Qty: 3 ML | Refills: 1 | Status: SHIPPED | OUTPATIENT
Start: 2021-07-06 | End: 2021-11-09 | Stop reason: ALTCHOICE

## 2021-08-03 ENCOUNTER — OFFICE VISIT (OUTPATIENT)
Dept: OPHTHALMOLOGY | Facility: CLINIC | Age: 81
End: 2021-08-03
Payer: MEDICARE

## 2021-08-03 DIAGNOSIS — H40.1133 PRIMARY OPEN ANGLE GLAUCOMA (POAG) OF BOTH EYES, SEVERE STAGE: Primary | ICD-10-CM

## 2021-08-03 DIAGNOSIS — H35.373 EPIRETINAL MEMBRANE, BILATERAL: ICD-10-CM

## 2021-08-03 DIAGNOSIS — H35.30 AMD (AGE-RELATED MACULAR DEGENERATION), BILATERAL: ICD-10-CM

## 2021-08-03 PROCEDURE — 1160F RVW MEDS BY RX/DR IN RCRD: CPT | Mod: CPTII,S$GLB,, | Performed by: OPHTHALMOLOGY

## 2021-08-03 PROCEDURE — 1159F MED LIST DOCD IN RCRD: CPT | Mod: CPTII,S$GLB,, | Performed by: OPHTHALMOLOGY

## 2021-08-03 PROCEDURE — 99214 OFFICE O/P EST MOD 30 MIN: CPT | Mod: S$GLB,,, | Performed by: OPHTHALMOLOGY

## 2021-08-03 PROCEDURE — 99214 PR OFFICE/OUTPT VISIT, EST, LEVL IV, 30-39 MIN: ICD-10-PCS | Mod: S$GLB,,, | Performed by: OPHTHALMOLOGY

## 2021-08-03 PROCEDURE — 1160F PR REVIEW ALL MEDS BY PRESCRIBER/CLIN PHARMACIST DOCUMENTED: ICD-10-PCS | Mod: CPTII,S$GLB,, | Performed by: OPHTHALMOLOGY

## 2021-08-03 PROCEDURE — 92134 CPTRZ OPH DX IMG PST SGM RTA: CPT | Mod: S$GLB,,, | Performed by: OPHTHALMOLOGY

## 2021-08-03 PROCEDURE — 92134 POSTERIOR SEGMENT OCT RETINA (OCULAR COHERENCE TOMOGRAPHY)-BOTH EYES: ICD-10-PCS | Mod: S$GLB,,, | Performed by: OPHTHALMOLOGY

## 2021-08-03 PROCEDURE — 99999 PR PBB SHADOW E&M-EST. PATIENT-LVL II: CPT | Mod: PBBFAC,,, | Performed by: OPHTHALMOLOGY

## 2021-08-03 PROCEDURE — 1159F PR MEDICATION LIST DOCUMENTED IN MEDICAL RECORD: ICD-10-PCS | Mod: CPTII,S$GLB,, | Performed by: OPHTHALMOLOGY

## 2021-08-03 PROCEDURE — 99999 PR PBB SHADOW E&M-EST. PATIENT-LVL II: ICD-10-PCS | Mod: PBBFAC,,, | Performed by: OPHTHALMOLOGY

## 2021-09-15 ENCOUNTER — OFFICE VISIT (OUTPATIENT)
Dept: OPHTHALMOLOGY | Facility: CLINIC | Age: 81
End: 2021-09-15
Payer: MEDICARE

## 2021-09-15 DIAGNOSIS — H40.1133 PRIMARY OPEN ANGLE GLAUCOMA (POAG) OF BOTH EYES, SEVERE STAGE: ICD-10-CM

## 2021-09-15 DIAGNOSIS — H34.8120 CENTRAL RETINAL VEIN OCCLUSION WITH MACULAR EDEMA OF LEFT EYE: ICD-10-CM

## 2021-09-15 PROCEDURE — 1160F PR REVIEW ALL MEDS BY PRESCRIBER/CLIN PHARMACIST DOCUMENTED: ICD-10-PCS | Mod: CPTII,S$GLB,, | Performed by: OPHTHALMOLOGY

## 2021-09-15 PROCEDURE — 99213 OFFICE O/P EST LOW 20 MIN: CPT | Mod: S$GLB,,, | Performed by: OPHTHALMOLOGY

## 2021-09-15 PROCEDURE — 99999 PR PBB SHADOW E&M-EST. PATIENT-LVL II: ICD-10-PCS | Mod: PBBFAC,,, | Performed by: OPHTHALMOLOGY

## 2021-09-15 PROCEDURE — 99213 PR OFFICE/OUTPT VISIT, EST, LEVL III, 20-29 MIN: ICD-10-PCS | Mod: S$GLB,,, | Performed by: OPHTHALMOLOGY

## 2021-09-15 PROCEDURE — 1160F RVW MEDS BY RX/DR IN RCRD: CPT | Mod: CPTII,S$GLB,, | Performed by: OPHTHALMOLOGY

## 2021-09-15 PROCEDURE — 1159F PR MEDICATION LIST DOCUMENTED IN MEDICAL RECORD: ICD-10-PCS | Mod: CPTII,S$GLB,, | Performed by: OPHTHALMOLOGY

## 2021-09-15 PROCEDURE — 1159F MED LIST DOCD IN RCRD: CPT | Mod: CPTII,S$GLB,, | Performed by: OPHTHALMOLOGY

## 2021-09-15 PROCEDURE — 99999 PR PBB SHADOW E&M-EST. PATIENT-LVL II: CPT | Mod: PBBFAC,,, | Performed by: OPHTHALMOLOGY

## 2021-09-15 RX ORDER — DORZOLAMIDE HYDROCHLORIDE AND TIMOLOL MALEATE PRESERVATIVE FREE 20; 5 MG/ML; MG/ML
1 SOLUTION/ DROPS OPHTHALMIC 2 TIMES DAILY
Qty: 90 EACH | Refills: 4 | Status: SHIPPED | OUTPATIENT
Start: 2021-09-15 | End: 2022-01-27 | Stop reason: SDUPTHER

## 2021-09-15 RX ORDER — LOTEPREDNOL ETABONATE 3.8 MG/G
1 GEL OPHTHALMIC 2 TIMES DAILY
Qty: 5 G | Refills: 4 | Status: SHIPPED | OUTPATIENT
Start: 2021-09-15 | End: 2022-06-22 | Stop reason: SDUPTHER

## 2021-09-15 RX ORDER — NEOMYCIN SULFATE, POLYMYXIN B SULFATE, AND DEXAMETHASONE 3.5; 10000; 1 MG/G; [USP'U]/G; MG/G
OINTMENT OPHTHALMIC EVERY 6 HOURS
Qty: 3.5 G | Refills: 3 | Status: SHIPPED | OUTPATIENT
Start: 2021-09-15 | End: 2021-11-09 | Stop reason: ALTCHOICE

## 2021-09-15 RX ORDER — DORZOLAMIDE HYDROCHLORIDE AND TIMOLOL MALEATE PRESERVATIVE FREE 20; 5 MG/ML; MG/ML
1 SOLUTION/ DROPS OPHTHALMIC 2 TIMES DAILY
Qty: 90 EACH | Refills: 4 | Status: SHIPPED | OUTPATIENT
Start: 2021-09-15 | End: 2021-09-15 | Stop reason: SDUPTHER

## 2021-10-26 ENCOUNTER — OFFICE VISIT (OUTPATIENT)
Dept: OPHTHALMOLOGY | Facility: CLINIC | Age: 81
End: 2021-10-26
Payer: MEDICARE

## 2021-10-26 DIAGNOSIS — H04.123 DRY EYE SYNDROME, BILATERAL: ICD-10-CM

## 2021-10-26 DIAGNOSIS — H34.8120 CENTRAL RETINAL VEIN OCCLUSION WITH MACULAR EDEMA OF LEFT EYE: ICD-10-CM

## 2021-10-26 DIAGNOSIS — H35.373 EPIRETINAL MEMBRANE, BILATERAL: ICD-10-CM

## 2021-10-26 DIAGNOSIS — H35.3134 ADVANCED ATROPHIC NONEXUDATIVE AGE-RELATED MACULAR DEGENERATION OF BOTH EYES WITH SUBFOVEAL INVOLVEMENT: ICD-10-CM

## 2021-10-26 DIAGNOSIS — H40.1133 PRIMARY OPEN ANGLE GLAUCOMA (POAG) OF BOTH EYES, SEVERE STAGE: Primary | ICD-10-CM

## 2021-10-26 PROCEDURE — 92134 POSTERIOR SEGMENT OCT RETINA (OCULAR COHERENCE TOMOGRAPHY)-BOTH EYES: ICD-10-PCS | Mod: S$GLB,,, | Performed by: OPHTHALMOLOGY

## 2021-10-26 PROCEDURE — 99999 PR PBB SHADOW E&M-EST. PATIENT-LVL II: ICD-10-PCS | Mod: PBBFAC,,, | Performed by: OPHTHALMOLOGY

## 2021-10-26 PROCEDURE — 1159F MED LIST DOCD IN RCRD: CPT | Mod: CPTII,S$GLB,, | Performed by: OPHTHALMOLOGY

## 2021-10-26 PROCEDURE — 99214 PR OFFICE/OUTPT VISIT, EST, LEVL IV, 30-39 MIN: ICD-10-PCS | Mod: S$GLB,,, | Performed by: OPHTHALMOLOGY

## 2021-10-26 PROCEDURE — 99214 OFFICE O/P EST MOD 30 MIN: CPT | Mod: S$GLB,,, | Performed by: OPHTHALMOLOGY

## 2021-10-26 PROCEDURE — 99999 PR PBB SHADOW E&M-EST. PATIENT-LVL II: CPT | Mod: PBBFAC,,, | Performed by: OPHTHALMOLOGY

## 2021-10-26 PROCEDURE — 1159F PR MEDICATION LIST DOCUMENTED IN MEDICAL RECORD: ICD-10-PCS | Mod: CPTII,S$GLB,, | Performed by: OPHTHALMOLOGY

## 2021-10-26 PROCEDURE — 92134 CPTRZ OPH DX IMG PST SGM RTA: CPT | Mod: S$GLB,,, | Performed by: OPHTHALMOLOGY

## 2021-11-09 ENCOUNTER — TELEPHONE (OUTPATIENT)
Dept: OPHTHALMOLOGY | Facility: CLINIC | Age: 81
End: 2021-11-09

## 2021-11-09 ENCOUNTER — OFFICE VISIT (OUTPATIENT)
Dept: OPHTHALMOLOGY | Facility: CLINIC | Age: 81
End: 2021-11-09
Payer: MEDICARE

## 2021-11-09 DIAGNOSIS — H40.1133 PRIMARY OPEN ANGLE GLAUCOMA (POAG) OF BOTH EYES, SEVERE STAGE: Primary | ICD-10-CM

## 2021-11-09 DIAGNOSIS — H04.123 DRY EYE SYNDROME, BILATERAL: ICD-10-CM

## 2021-11-09 DIAGNOSIS — H34.8120 CENTRAL RETINAL VEIN OCCLUSION WITH MACULAR EDEMA OF LEFT EYE: ICD-10-CM

## 2021-11-09 DIAGNOSIS — H35.373 EPIRETINAL MEMBRANE, BILATERAL: ICD-10-CM

## 2021-11-09 PROCEDURE — 1159F MED LIST DOCD IN RCRD: CPT | Mod: CPTII,S$GLB,, | Performed by: OPHTHALMOLOGY

## 2021-11-09 PROCEDURE — 99999 PR PBB SHADOW E&M-EST. PATIENT-LVL II: ICD-10-PCS | Mod: PBBFAC,,, | Performed by: OPHTHALMOLOGY

## 2021-11-09 PROCEDURE — 99214 OFFICE O/P EST MOD 30 MIN: CPT | Mod: S$GLB,,, | Performed by: OPHTHALMOLOGY

## 2021-11-09 PROCEDURE — 1160F PR REVIEW ALL MEDS BY PRESCRIBER/CLIN PHARMACIST DOCUMENTED: ICD-10-PCS | Mod: CPTII,S$GLB,, | Performed by: OPHTHALMOLOGY

## 2021-11-09 PROCEDURE — 1160F RVW MEDS BY RX/DR IN RCRD: CPT | Mod: CPTII,S$GLB,, | Performed by: OPHTHALMOLOGY

## 2021-11-09 PROCEDURE — 1159F PR MEDICATION LIST DOCUMENTED IN MEDICAL RECORD: ICD-10-PCS | Mod: CPTII,S$GLB,, | Performed by: OPHTHALMOLOGY

## 2021-11-09 PROCEDURE — 99999 PR PBB SHADOW E&M-EST. PATIENT-LVL II: CPT | Mod: PBBFAC,,, | Performed by: OPHTHALMOLOGY

## 2021-11-09 PROCEDURE — 99214 PR OFFICE/OUTPT VISIT, EST, LEVL IV, 30-39 MIN: ICD-10-PCS | Mod: S$GLB,,, | Performed by: OPHTHALMOLOGY

## 2021-11-09 RX ORDER — TAFLUPROST 0 MG/.3ML
1 SOLUTION/ DROPS OPHTHALMIC NIGHTLY
Qty: 1 EACH | Refills: 6 | Status: SHIPPED | OUTPATIENT
Start: 2021-11-09 | End: 2022-01-27 | Stop reason: SDUPTHER

## 2021-12-14 ENCOUNTER — OFFICE VISIT (OUTPATIENT)
Dept: OPHTHALMOLOGY | Facility: CLINIC | Age: 81
End: 2021-12-14
Payer: MEDICARE

## 2021-12-14 DIAGNOSIS — H04.123 DRY EYE SYNDROME, BILATERAL: ICD-10-CM

## 2021-12-14 DIAGNOSIS — H35.373 EPIRETINAL MEMBRANE, BILATERAL: ICD-10-CM

## 2021-12-14 DIAGNOSIS — H40.1133 PRIMARY OPEN ANGLE GLAUCOMA (POAG) OF BOTH EYES, SEVERE STAGE: Primary | ICD-10-CM

## 2021-12-14 DIAGNOSIS — H34.8120 CENTRAL RETINAL VEIN OCCLUSION WITH MACULAR EDEMA OF LEFT EYE: ICD-10-CM

## 2021-12-14 PROCEDURE — 99999 PR PBB SHADOW E&M-EST. PATIENT-LVL II: ICD-10-PCS | Mod: PBBFAC,,, | Performed by: OPHTHALMOLOGY

## 2021-12-14 PROCEDURE — 99999 PR PBB SHADOW E&M-EST. PATIENT-LVL II: CPT | Mod: PBBFAC,,, | Performed by: OPHTHALMOLOGY

## 2021-12-14 PROCEDURE — 99213 OFFICE O/P EST LOW 20 MIN: CPT | Mod: S$GLB,,, | Performed by: OPHTHALMOLOGY

## 2021-12-14 PROCEDURE — 99213 PR OFFICE/OUTPT VISIT, EST, LEVL III, 20-29 MIN: ICD-10-PCS | Mod: S$GLB,,, | Performed by: OPHTHALMOLOGY

## 2021-12-14 RX ORDER — BRIMONIDINE TARTRATE 2 MG/ML
1 SOLUTION/ DROPS OPHTHALMIC 2 TIMES DAILY
Qty: 5 ML | Refills: 1 | Status: SHIPPED | OUTPATIENT
Start: 2021-12-14 | End: 2021-12-20 | Stop reason: ALTCHOICE

## 2021-12-20 RX ORDER — BRIMONIDINE TARTRATE 1 MG/ML
1 SOLUTION/ DROPS OPHTHALMIC 2 TIMES DAILY
COMMUNITY
End: 2021-12-20 | Stop reason: SDUPTHER

## 2021-12-21 RX ORDER — BRIMONIDINE TARTRATE 1 MG/ML
1 SOLUTION/ DROPS OPHTHALMIC 2 TIMES DAILY
Qty: 10 ML | Refills: 3 | Status: SHIPPED | OUTPATIENT
Start: 2021-12-21 | End: 2022-10-05 | Stop reason: SDUPTHER

## 2022-01-06 ENCOUNTER — TELEPHONE (OUTPATIENT)
Dept: OPHTHALMOLOGY | Facility: CLINIC | Age: 82
End: 2022-01-06
Payer: MEDICARE

## 2022-01-06 NOTE — TELEPHONE ENCOUNTER
Called patient to notify them of appointment change. left message , No anwser, pt is also active on patient portal

## 2022-01-06 NOTE — TELEPHONE ENCOUNTER
----- Message from Hoda Luna MA sent at 1/6/2022  3:18 PM CST -----  Contact: Abby    ----- Message -----  From: Chelsie Bailey  Sent: 1/6/2022   2:51 PM CST  To: [facility] Scheduling    Abby called in to say that she is still waiting on a call back at [phone]. To get an appointment    Thanks  Td

## 2022-01-27 ENCOUNTER — OFFICE VISIT (OUTPATIENT)
Dept: OPHTHALMOLOGY | Facility: CLINIC | Age: 82
End: 2022-01-27
Payer: MEDICARE

## 2022-01-27 DIAGNOSIS — H04.123 DRY EYE SYNDROME, BILATERAL: ICD-10-CM

## 2022-01-27 DIAGNOSIS — H34.8120 CENTRAL RETINAL VEIN OCCLUSION WITH MACULAR EDEMA OF LEFT EYE: ICD-10-CM

## 2022-01-27 DIAGNOSIS — H35.373 EPIRETINAL MEMBRANE, BILATERAL: ICD-10-CM

## 2022-01-27 DIAGNOSIS — H40.1133 PRIMARY OPEN ANGLE GLAUCOMA (POAG) OF BOTH EYES, SEVERE STAGE: Primary | ICD-10-CM

## 2022-01-27 PROCEDURE — 99214 PR OFFICE/OUTPT VISIT, EST, LEVL IV, 30-39 MIN: ICD-10-PCS | Mod: S$GLB,,, | Performed by: OPHTHALMOLOGY

## 2022-01-27 PROCEDURE — 92083 EXTENDED VISUAL FIELD XM: CPT | Mod: S$GLB,,, | Performed by: OPHTHALMOLOGY

## 2022-01-27 PROCEDURE — 92083 HUMPHREY VISUAL FIELD - OU - BOTH EYES: ICD-10-PCS | Mod: S$GLB,,, | Performed by: OPHTHALMOLOGY

## 2022-01-27 PROCEDURE — 99999 PR PBB SHADOW E&M-EST. PATIENT-LVL II: CPT | Mod: PBBFAC,,, | Performed by: OPHTHALMOLOGY

## 2022-01-27 PROCEDURE — 1160F PR REVIEW ALL MEDS BY PRESCRIBER/CLIN PHARMACIST DOCUMENTED: ICD-10-PCS | Mod: CPTII,S$GLB,, | Performed by: OPHTHALMOLOGY

## 2022-01-27 PROCEDURE — 99999 PR PBB SHADOW E&M-EST. PATIENT-LVL II: ICD-10-PCS | Mod: PBBFAC,,, | Performed by: OPHTHALMOLOGY

## 2022-01-27 PROCEDURE — 1159F MED LIST DOCD IN RCRD: CPT | Mod: CPTII,S$GLB,, | Performed by: OPHTHALMOLOGY

## 2022-01-27 PROCEDURE — 1160F RVW MEDS BY RX/DR IN RCRD: CPT | Mod: CPTII,S$GLB,, | Performed by: OPHTHALMOLOGY

## 2022-01-27 PROCEDURE — 1159F PR MEDICATION LIST DOCUMENTED IN MEDICAL RECORD: ICD-10-PCS | Mod: CPTII,S$GLB,, | Performed by: OPHTHALMOLOGY

## 2022-01-27 PROCEDURE — 99214 OFFICE O/P EST MOD 30 MIN: CPT | Mod: S$GLB,,, | Performed by: OPHTHALMOLOGY

## 2022-01-27 RX ORDER — TAFLUPROST 0 MG/.3ML
1 SOLUTION/ DROPS OPHTHALMIC NIGHTLY
Qty: 90 EACH | Refills: 3 | Status: SHIPPED | OUTPATIENT
Start: 2022-01-27 | End: 2022-04-27

## 2022-01-27 RX ORDER — DORZOLAMIDE HYDROCHLORIDE AND TIMOLOL MALEATE PRESERVATIVE FREE 20; 5 MG/ML; MG/ML
1 SOLUTION/ DROPS OPHTHALMIC 2 TIMES DAILY
Qty: 90 EACH | Refills: 4 | Status: SHIPPED | OUTPATIENT
Start: 2022-01-27 | End: 2022-10-25 | Stop reason: SDUPTHER

## 2022-01-27 NOTE — PROGRESS NOTES
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 Cristofer Worley pt     1. Glaucoma OU   Fhx of Glaucoma (2 brothers)   SLT OU 7/9/2020   Trab OU 4/19 in [place]   3x Bleb Needling OU (last one 6/2019)   Ahmed Valve OD 01/21/21   Ahmed OS 9/10/20/ [id number]/REF/FP7/LOT    2. RK OU   3. PCIOL OU   Yag OU   4. 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 (History)            Assessment /Plan     For exam results, see Encounter Report.      ICD-10-CM ICD-9-CM    1. Primary open angle glaucoma (POAG) of both eyes, severe stage  H40.1133 365.11 Ruiz Visual Field - OU - Extended - Both Eyes    IOP better today      365.73    2. Central retinal vein occlusion with macular edema of left eye  H34.8120 362.35 H.o - follow      362.83    3. Epiretinal membrane, bilateral  H35.373 362.56    4. Dry eye syndrome, bilateral  H04.123 375.15 Needs preservative free meds to help with dry eyes        Zioptan qd OU  PF Cosopt BID OU  Alphagan BID OU  Increase Lotemax OU QD     RETURN TO CLINIC 1 month           "

## 2022-02-25 ENCOUNTER — OFFICE VISIT (OUTPATIENT)
Dept: OPHTHALMOLOGY | Facility: CLINIC | Age: 82
End: 2022-02-25
Payer: MEDICARE

## 2022-02-25 DIAGNOSIS — H40.1133 PRIMARY OPEN ANGLE GLAUCOMA (POAG) OF BOTH EYES, SEVERE STAGE: Primary | ICD-10-CM

## 2022-02-25 DIAGNOSIS — H35.373 EPIRETINAL MEMBRANE, BILATERAL: ICD-10-CM

## 2022-02-25 DIAGNOSIS — H04.123 DRY EYE SYNDROME, BILATERAL: ICD-10-CM

## 2022-02-25 PROCEDURE — 1159F MED LIST DOCD IN RCRD: CPT | Mod: CPTII,S$GLB,, | Performed by: OPHTHALMOLOGY

## 2022-02-25 PROCEDURE — 1160F PR REVIEW ALL MEDS BY PRESCRIBER/CLIN PHARMACIST DOCUMENTED: ICD-10-PCS | Mod: CPTII,S$GLB,, | Performed by: OPHTHALMOLOGY

## 2022-02-25 PROCEDURE — 99214 OFFICE O/P EST MOD 30 MIN: CPT | Mod: S$GLB,,, | Performed by: OPHTHALMOLOGY

## 2022-02-25 PROCEDURE — 92133 CPTRZD OPH DX IMG PST SGM ON: CPT | Mod: S$GLB,,, | Performed by: OPHTHALMOLOGY

## 2022-02-25 PROCEDURE — 1159F PR MEDICATION LIST DOCUMENTED IN MEDICAL RECORD: ICD-10-PCS | Mod: CPTII,S$GLB,, | Performed by: OPHTHALMOLOGY

## 2022-02-25 PROCEDURE — 99214 PR OFFICE/OUTPT VISIT, EST, LEVL IV, 30-39 MIN: ICD-10-PCS | Mod: S$GLB,,, | Performed by: OPHTHALMOLOGY

## 2022-02-25 PROCEDURE — 1160F RVW MEDS BY RX/DR IN RCRD: CPT | Mod: CPTII,S$GLB,, | Performed by: OPHTHALMOLOGY

## 2022-02-25 PROCEDURE — 99999 PR PBB SHADOW E&M-EST. PATIENT-LVL II: ICD-10-PCS | Mod: PBBFAC,,, | Performed by: OPHTHALMOLOGY

## 2022-02-25 PROCEDURE — 99999 PR PBB SHADOW E&M-EST. PATIENT-LVL II: CPT | Mod: PBBFAC,,, | Performed by: OPHTHALMOLOGY

## 2022-02-25 PROCEDURE — 92133 POSTERIOR SEGMENT OCT OPTIC NERVE(OCULAR COHERENCE TOMOGRAPHY) - OU - BOTH EYES: ICD-10-PCS | Mod: S$GLB,,, | Performed by: OPHTHALMOLOGY

## 2022-02-25 NOTE — PROGRESS NOTES
HPI     Glaucoma     Comments: IOP check. Pt reports Va is stable. Denies any pain or   discomfort. 100% compliant with drops.               Comments       1. Glaucoma OU   Fhx of Glaucoma (2 brothers)   SLT OU 7/9/2020   Trab OU 4/19 in [place]   3x Bleb Needling OU (last one 6/2019)   Ahmed Valve OD 01/21/21   Ahmed OS 9/10/20/ [id number]/REF/FP7/LOT    2. RK OU   3. PCIOL OU   Yag OU   4. 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 (History)            Assessment /Plan     For exam results, see Encounter Report.      ICD-10-CM ICD-9-CM    1. Primary open angle glaucoma (POAG) of both eyes, severe stage  H40.1133 365.11 Posterior Segment OCT Optic Nerve- Both eyes done today   Doing well - intraocular pressure is within acceptable range relative to target pressure with no evidence of progression.   Continue current treatment.  Reviewed importance of continued compliance with treatment and follow up.        365.73    2. Dry eye syndrome, bilateral  H04.123 375.15 Stable- use tears    3. Epiretinal membrane, bilateral  H35.373 362.56 Stable- follow        RETURN TO CLINIC 2 month IOP- pt reqt to be followed sooner in 6 week for IOP         Zioptan qd OU  PF Cosopt BID OU  Alphagan BID OU  Lotemax qd OU

## 2022-04-05 ENCOUNTER — OFFICE VISIT (OUTPATIENT)
Dept: OPHTHALMOLOGY | Facility: CLINIC | Age: 82
End: 2022-04-05
Payer: MEDICARE

## 2022-04-05 DIAGNOSIS — H40.1133 PRIMARY OPEN ANGLE GLAUCOMA (POAG) OF BOTH EYES, SEVERE STAGE: Primary | ICD-10-CM

## 2022-04-05 DIAGNOSIS — H35.3134 ADVANCED ATROPHIC NONEXUDATIVE AGE-RELATED MACULAR DEGENERATION OF BOTH EYES WITH SUBFOVEAL INVOLVEMENT: ICD-10-CM

## 2022-04-05 DIAGNOSIS — H04.123 DRY EYE SYNDROME, BILATERAL: ICD-10-CM

## 2022-04-05 DIAGNOSIS — H34.8120 CENTRAL RETINAL VEIN OCCLUSION WITH MACULAR EDEMA OF LEFT EYE: ICD-10-CM

## 2022-04-05 DIAGNOSIS — H52.7 REFRACTIVE ERROR: ICD-10-CM

## 2022-04-05 DIAGNOSIS — H35.373 EPIRETINAL MEMBRANE, BILATERAL: ICD-10-CM

## 2022-04-05 PROCEDURE — 1160F RVW MEDS BY RX/DR IN RCRD: CPT | Mod: CPTII,S$GLB,, | Performed by: OPHTHALMOLOGY

## 2022-04-05 PROCEDURE — 99999 PR PBB SHADOW E&M-EST. PATIENT-LVL I: ICD-10-PCS | Mod: PBBFAC,,, | Performed by: OPHTHALMOLOGY

## 2022-04-05 PROCEDURE — 1160F PR REVIEW ALL MEDS BY PRESCRIBER/CLIN PHARMACIST DOCUMENTED: ICD-10-PCS | Mod: CPTII,S$GLB,, | Performed by: OPHTHALMOLOGY

## 2022-04-05 PROCEDURE — 1159F PR MEDICATION LIST DOCUMENTED IN MEDICAL RECORD: ICD-10-PCS | Mod: CPTII,S$GLB,, | Performed by: OPHTHALMOLOGY

## 2022-04-05 PROCEDURE — 99213 OFFICE O/P EST LOW 20 MIN: CPT | Mod: S$GLB,,, | Performed by: OPHTHALMOLOGY

## 2022-04-05 PROCEDURE — 99999 PR PBB SHADOW E&M-EST. PATIENT-LVL I: CPT | Mod: PBBFAC,,, | Performed by: OPHTHALMOLOGY

## 2022-04-05 PROCEDURE — 1159F MED LIST DOCD IN RCRD: CPT | Mod: CPTII,S$GLB,, | Performed by: OPHTHALMOLOGY

## 2022-04-05 PROCEDURE — 99213 PR OFFICE/OUTPT VISIT, EST, LEVL III, 20-29 MIN: ICD-10-PCS | Mod: S$GLB,,, | Performed by: OPHTHALMOLOGY

## 2022-04-05 RX ORDER — NEOMYCIN SULFATE, POLYMYXIN B SULFATE, AND DEXAMETHASONE 3.5; 10000; 1 MG/G; [USP'U]/G; MG/G
OINTMENT OPHTHALMIC EVERY 6 HOURS
Qty: 3.5 G | Refills: 2 | Status: SHIPPED | OUTPATIENT
Start: 2022-04-05 | End: 2022-06-29 | Stop reason: SDUPTHER

## 2022-04-05 NOTE — PROGRESS NOTES
HPI     Glaucoma     Comments: 6 wk IOP check               Comments     States that her vision is stable and that she has been compliant with   gtts as directed.     1. Glaucoma OU   Fhx of Glaucoma (2 brothers)   SLT OU 7/9/2020   Trab OU 4/19 in [place]   3x Bleb Needling OU (last one 6/2019)   Ahmed Valve OD 01/21/21   Ahmed OS 9/10/20/ [id number]/REF/FP7/LOT    2. RK OU   3. PCIOL OU   Yag OU   4. 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 (History)            Assessment /Plan     For exam results, see Encounter Report.      ICD-10-CM ICD-9-CM    1. Primary open angle glaucoma (POAG) of both eyes, severe stage  H40.1133 365.11 Doing well - intraocular pressure is within acceptable range relative to target pressure with no evidence of progression.   Continue current treatment.  Reviewed importance of continued compliance with treatment and follow up.        365.73    2. Dry eye syndrome, bilateral  H04.123 375.15 Pt request refill of Dexacine, will send refill   3. Epiretinal membrane, bilateral  H35.373 362.56    4. Central retinal vein occlusion with macular edema of left eye  H34.1620 362.35      362.83    5. 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 Refractive error  H52.7 367.9        Zioptan qd OU  PF Cosopt BID OU  Alphagan BID OU  Lotemax qd OU  Dexacine PRN       Return to clinic 6 weeks for IOP check

## 2022-06-08 ENCOUNTER — OFFICE VISIT (OUTPATIENT)
Dept: OPHTHALMOLOGY | Facility: CLINIC | Age: 82
End: 2022-06-08
Payer: MEDICARE

## 2022-06-08 DIAGNOSIS — H04.123 DRY EYE SYNDROME, BILATERAL: ICD-10-CM

## 2022-06-08 DIAGNOSIS — H40.1133 PRIMARY OPEN ANGLE GLAUCOMA (POAG) OF BOTH EYES, SEVERE STAGE: Primary | ICD-10-CM

## 2022-06-08 DIAGNOSIS — H34.8120 CENTRAL RETINAL VEIN OCCLUSION WITH MACULAR EDEMA OF LEFT EYE: ICD-10-CM

## 2022-06-08 DIAGNOSIS — H35.373 EPIRETINAL MEMBRANE, BILATERAL: ICD-10-CM

## 2022-06-08 DIAGNOSIS — T81.30XA WOUND DEHISCENCE: ICD-10-CM

## 2022-06-08 PROCEDURE — 99214 PR OFFICE/OUTPT VISIT, EST, LEVL IV, 30-39 MIN: ICD-10-PCS | Mod: S$GLB,,, | Performed by: OPHTHALMOLOGY

## 2022-06-08 PROCEDURE — 1159F PR MEDICATION LIST DOCUMENTED IN MEDICAL RECORD: ICD-10-PCS | Mod: CPTII,S$GLB,, | Performed by: OPHTHALMOLOGY

## 2022-06-08 PROCEDURE — 99214 OFFICE O/P EST MOD 30 MIN: CPT | Mod: S$GLB,,, | Performed by: OPHTHALMOLOGY

## 2022-06-08 PROCEDURE — 1160F PR REVIEW ALL MEDS BY PRESCRIBER/CLIN PHARMACIST DOCUMENTED: ICD-10-PCS | Mod: CPTII,S$GLB,, | Performed by: OPHTHALMOLOGY

## 2022-06-08 PROCEDURE — 99999 PR PBB SHADOW E&M-EST. PATIENT-LVL II: CPT | Mod: PBBFAC,,, | Performed by: OPHTHALMOLOGY

## 2022-06-08 PROCEDURE — 1159F MED LIST DOCD IN RCRD: CPT | Mod: CPTII,S$GLB,, | Performed by: OPHTHALMOLOGY

## 2022-06-08 PROCEDURE — 1160F RVW MEDS BY RX/DR IN RCRD: CPT | Mod: CPTII,S$GLB,, | Performed by: OPHTHALMOLOGY

## 2022-06-08 PROCEDURE — 99999 PR PBB SHADOW E&M-EST. PATIENT-LVL II: ICD-10-PCS | Mod: PBBFAC,,, | Performed by: OPHTHALMOLOGY

## 2022-06-08 RX ORDER — KETOROLAC TROMETHAMINE 5 MG/ML
1 SOLUTION OPHTHALMIC 4 TIMES DAILY
Qty: 6 ML | Refills: 18 | Status: SHIPPED | OUTPATIENT
Start: 2022-06-08 | End: 2022-07-26

## 2022-06-08 RX ORDER — POLYMYXIN B SULFATE AND TRIMETHOPRIM 1; 10000 MG/ML; [USP'U]/ML
1 SOLUTION OPHTHALMIC EVERY 4 HOURS
Qty: 10 ML | Refills: 3 | Status: SHIPPED | OUTPATIENT
Start: 2022-06-08 | End: 2022-06-13

## 2022-06-08 RX ORDER — DIFLUPREDNATE OPHTHALMIC 0.5 MG/ML
1 EMULSION OPHTHALMIC 4 TIMES DAILY
Qty: 5 ML | Refills: 1 | Status: SHIPPED | OUTPATIENT
Start: 2022-06-08 | End: 2022-07-09

## 2022-06-08 NOTE — PROGRESS NOTES
"HPI     Glaucoma     Comments: Patient reports for IOP check. Using gtts as advised.              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 Cristofer Worley pt     1. Glaucoma OU   Fhx of Glaucoma (2 brothers)   SLT OU 7/9/2020   Trab OU 4/19 in [place]   3x Bleb Needling OU (last one 6/2019)   Ahmed Valve OD 01/21/21   Ahmed OS 9/10/20/ [id number]/REF/FP7/LOT    2. RK OU   3. PCIOL OU   Yag OU   4. 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 (History)            Assessment /Plan     For exam results, see Encounter Report.      ICD-10-CM ICD-9-CM    1. Primary open angle glaucoma (POAG) of both eyes, severe stage  H40.1133 365.11 Doing well - intraocular pressure is within acceptable range relative to target pressure with no evidence of progression.   Continue current treatment.  Reviewed importance of continued compliance with treatment and follow up.        365.73    2. Wound dehiscence  T81.30XA 998.30 OD, present today and needs surgery - recommend surgery tomorrow but patient stated that was too soon.     Book Revision of Wound OD with Patch Graft at patient's preferred date and time.   Topical    Will use Poly, Durezol  and Keto   Start Poly QID OD x 1 week prior surgery - start Durezol and Keto (the day after surgery)  Pt denies taking any ASA or Blood thinners      3. Dry eye syndrome, bilateral  H04.123 375.15 As before, use tears    4. Epiretinal membrane, bilateral  H35.373 362.56      5. Central retinal vein occlusion with macular edema of left eye  H34.8120 362.35 H/o      362.83      IOP controlled at this time - will schedule repair of erosion.      Zioptan qd OU  PF Cosopt BID " OU  Alphagan BID OU  Lotemax qd OU

## 2022-06-13 ENCOUNTER — TELEPHONE (OUTPATIENT)
Dept: OPHTHALMOLOGY | Facility: CLINIC | Age: 82
End: 2022-06-13
Payer: MEDICARE

## 2022-06-13 DIAGNOSIS — H40.1133 PRIMARY OPEN ANGLE GLAUCOMA (POAG) OF BOTH EYES, SEVERE STAGE: ICD-10-CM

## 2022-06-13 DIAGNOSIS — T81.30XA WOUND DEHISCENCE: ICD-10-CM

## 2022-06-13 RX ORDER — POLYMYXIN B SULFATE AND TRIMETHOPRIM 1; 10000 MG/ML; [USP'U]/ML
SOLUTION OPHTHALMIC
Qty: 10 ML | Refills: 3 | Status: SHIPPED | OUTPATIENT
Start: 2022-06-13 | End: 2022-06-14 | Stop reason: SDUPTHER

## 2022-06-13 NOTE — TELEPHONE ENCOUNTER
Sending over pt information/demographics to dr shaikh at [facility] for 2nd opinion      [phone]  Fax confirmation received 6/13/22 at 10:30am

## 2022-06-14 DIAGNOSIS — T81.30XA WOUND DEHISCENCE: ICD-10-CM

## 2022-06-14 DIAGNOSIS — H40.1133 PRIMARY OPEN ANGLE GLAUCOMA (POAG) OF BOTH EYES, SEVERE STAGE: ICD-10-CM

## 2022-06-14 RX ORDER — POLYMYXIN B SULFATE AND TRIMETHOPRIM 1; 10000 MG/ML; [USP'U]/ML
1 SOLUTION OPHTHALMIC 4 TIMES DAILY
Qty: 10 ML | Refills: 3 | Status: SHIPPED | OUTPATIENT
Start: 2022-06-14 | End: 2022-07-26

## 2022-06-14 NOTE — TELEPHONE ENCOUNTER
----- Message from Myesha Tucker sent at 6/14/2022 10:13 AM CDT -----  .Type:  Patient Returning Call     Who Called:   Who Left Message for Patient: nurse   Does the patient know what this is regarding?:   Would the patient rather a call back or a response via [facility]?     Best Call Back Number:  [phone] (home)         Additional Information: Patient also need this filled because [facility] did not have it:    polymyxin B sulf-trimethoprim (POLYTRIM) 10,000 unit- 1 mg/mL Drop     Please send this to   [facility]   [facility] [place]

## 2022-06-14 NOTE — TELEPHONE ENCOUNTER
----- Message from Ofelia Tucker sent at 6/14/2022  8:55 AM CDT -----  Contact: pt  The pt request a return call concerning his eye drop medication, no additional info given and can be reached at [phone]///[id number]

## 2022-06-15 ENCOUNTER — DOCUMENTATION ONLY (OUTPATIENT)
Dept: OPHTHALMOLOGY | Facility: CLINIC | Age: 82
End: 2022-06-15
Payer: MEDICARE

## 2022-06-15 NOTE — PROGRESS NOTES
Short Stay Record    CC: Uncontrolled glaucoma right eye    HPI:  Abby Temple is a 82 y.o. female who presents for evaluation prior to ophthalmic surgery, right eye. Patient presents with uncontrolled glaucoma with intraocular pressure above target with significant risk of irreversible vision loss which requires surgical intervention.      Past Surgical History:   Procedure Laterality Date    Ahmed Left 09/10/2020    ahmed Right 01/21/2021    CATARACT EXTRACTION      REFRACTIVE SURGERY Bilateral     SLT - OU - BOTH EYES Bilateral 07/09/2020     Social History     Tobacco Use    Smoking status: Never Smoker    Smokeless tobacco: Never Used   Substance Use Topics    Alcohol use: Never     Family History   Problem Relation Age of Onset    Glaucoma Brother      Review of patient's allergies indicates:  No Known Allergies      Current Outpatient Medications:     brimonidine 0.1% (ALPHAGAN P) 0.1 % Drop, Place 1 drop into both eyes 2 (two) times daily., Disp: 10 mL, Rfl: 3    ciclopirox (LOPROX) 0.77 % Susp, APPLY THINLY TO THE AFFECTED NAIL DAILY, Disp: , Rfl:     difluprednate (DUREZOL) 0.05 % Drop ophthalmic solution, Place 1 drop into the right eye 4 (four) times daily., Disp: 5 mL, Rfl: 1    dorzolamide-timolol, PF, (COSOPT PF) 2-0.5 % Dpet ophthalmic solution, Place 1 drop into both eyes 2 (two) times daily., Disp: 90 each, Rfl: 4    econazole nitrate 1 % cream, , Disp: , Rfl:     escitalopram oxalate (LEXAPRO) 20 MG tablet, Take 20 mg by mouth. Patient states that she takes 10 mg daily, Disp: , Rfl:     ketorolac 0.5% (ACULAR) 0.5 % Drop, Place 1 drop into the right eye 4 (four) times daily., Disp: 6 mL, Rfl: 18    loteprednol etabonate (LOTEMAX SM) 0.38 % DrpG, Place 1 drop into both eyes 2 (two) times a day. (Patient not taking: Reported on 12/14/2021), Disp: 5 g, Rfl: 4    neomycin-polymyxin-dexamethasone (DEXACINE) 3.5 mg/g-10,000 unit/g-0.1 % Oint, Place into both eyes every 6 (six)  hours., Disp: 3.5 g, Rfl: 2    polymyxin B sulf-trimethoprim (POLYTRIM) 10,000 unit- 1 mg/mL Drop, Place 1 drop into the right eye 4 (four) times daily., Disp: 10 mL, Rfl: 3    Review of Systems:  A comprehensive review of systems was negative.    Physical Exam:  General Appearance:    A&Ox3, no distress, appears stated age   Head:    Normocephalic, without obvious abnormality, atraumatic   Eyes:    PERRL, EOM's intact   Back:     Symmetric, no curvature   Lungs:     Respirations unlabored   Chest Wall:    No tenderness or deformity    Heart:  Abdomen:  Extremities:  Skin:    S1 and S2 present    Soft, non-tender    Extremities normal, atraumatic    Skin color, texture, turgor normal   External Exam     Right Left   External Normal Normal        Right Left   Lids/Lashes Normal 1+ Chalazion   Conjunctiva/Sclera trace Bleb, trab at 1 o'clock, Erosion of Tube, Superior-temporal Ahmed valve trace Bleb, trab at 1 o'clock, 2+ Injection, Superior-temporal Ahmed    Cornea 4 cut RK 4 cut RK, with LRI and AK, +2 pigment on endo.   Anterior Chamber Trace Cell: Occasional Trace Cell: Occasional   Iris Patent peripheral iridectomy at 1 o'clock Patent peripheral iridectomy at  12 o'clock   Lens Posterior chamber intraocular lens, Open posterior capsule, crystal lens inf henge angled inf Posterior chamber intraocular lens, Open posterior capsule,crystal lens inf henge angled inf         Wearing Rx     Sphere Cylinder Axis   Right -1.25 +0.50 015   Left -1.00 +0.75 180   Type: Distance      Manifest Refraction     Sphere Cylinder [place] Dist VA   Right -1.25 +0.75 020 20/30-   Left               Impression: Primary Open Angle Glaucoma severe  stage : Right eye and wound dehiscence     Planned Procedure:    Book Revision of Wound OD with Patch Graft at patient's preferred date and time.   Topical    Use Poly, Durezol and Keto (start poly 1 week prior sx)         Patient cleared for ophthalmic surgery.     Discharge  Summary:    Admitting Diagnosis: Primary open angle glaucoma severe  stage OD and wound Dehiscence     Discharge Diagnosis: same    Procedure: s/p glaucoma surgery as per dictation    Complications: None  Discharge Condition: Stable

## 2022-06-23 ENCOUNTER — OUTSIDE PLACE OF SERVICE (OUTPATIENT)
Dept: OPHTHALMOLOGY | Facility: CLINIC | Age: 82
End: 2022-06-23
Payer: MEDICARE

## 2022-06-23 PROCEDURE — 66250 FOLLOW-UP SURGERY OF EYE: CPT | Mod: RT,,, | Performed by: OPHTHALMOLOGY

## 2022-06-23 PROCEDURE — 66250: ICD-10-PCS | Mod: RT,,, | Performed by: OPHTHALMOLOGY

## 2022-06-24 ENCOUNTER — OFFICE VISIT (OUTPATIENT)
Dept: OPHTHALMOLOGY | Facility: CLINIC | Age: 82
End: 2022-06-24
Payer: MEDICARE

## 2022-06-24 DIAGNOSIS — Z98.890 POST-OPERATIVE STATE: Primary | ICD-10-CM

## 2022-06-24 DIAGNOSIS — H40.1133 PRIMARY OPEN ANGLE GLAUCOMA (POAG) OF BOTH EYES, SEVERE STAGE: ICD-10-CM

## 2022-06-24 PROCEDURE — 1159F PR MEDICATION LIST DOCUMENTED IN MEDICAL RECORD: ICD-10-PCS | Mod: CPTII,S$GLB,, | Performed by: OPHTHALMOLOGY

## 2022-06-24 PROCEDURE — 99999 PR PBB SHADOW E&M-EST. PATIENT-LVL II: CPT | Mod: PBBFAC,,, | Performed by: OPHTHALMOLOGY

## 2022-06-24 PROCEDURE — 1159F MED LIST DOCD IN RCRD: CPT | Mod: CPTII,S$GLB,, | Performed by: OPHTHALMOLOGY

## 2022-06-24 PROCEDURE — 1160F RVW MEDS BY RX/DR IN RCRD: CPT | Mod: CPTII,S$GLB,, | Performed by: OPHTHALMOLOGY

## 2022-06-24 PROCEDURE — 99024 POSTOP FOLLOW-UP VISIT: CPT | Mod: S$GLB,,, | Performed by: OPHTHALMOLOGY

## 2022-06-24 PROCEDURE — 99999 PR PBB SHADOW E&M-EST. PATIENT-LVL II: ICD-10-PCS | Mod: PBBFAC,,, | Performed by: OPHTHALMOLOGY

## 2022-06-24 PROCEDURE — 99024 PR POST-OP FOLLOW-UP VISIT: ICD-10-PCS | Mod: S$GLB,,, | Performed by: OPHTHALMOLOGY

## 2022-06-24 PROCEDURE — 1160F PR REVIEW ALL MEDS BY PRESCRIBER/CLIN PHARMACIST DOCUMENTED: ICD-10-PCS | Mod: CPTII,S$GLB,, | Performed by: OPHTHALMOLOGY

## 2022-06-24 NOTE — PROGRESS NOTES
"HPI     Post-op Evaluation     Comments: S/p 1 day wound revision with patch graft. Using gtts as   advised. Patched removed this morning 8:07. Reports of occ pain when patch   was removed. 1/10 pain scale.              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 Cristofer Worley pt     1. Glaucoma OU   Fhx of Glaucoma (2 brothers)   SLT OU 7/9/2020   Trab OU 4/19 in [place]   3x Bleb Needling OU (last one 6/2019)   Ahmed Valve OD 01/21/21   Ahmed OS 9/10/20/ [id number]/REF/FP7/LOT    Wound revision w/ patch graft OD 6/23/22  2. RK OU   3. PCIOL OU   Yag OU   4. 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 (History)            Assessment /Plan     For exam results, see Encounter Report.      ICD-10-CM ICD-9-CM    1. Post-operative state  Z98.890 V45.89    2. Primary open angle glaucoma (POAG) of both eyes, severe stage  H40.1133 365.11      365.73        S/p 1 day wound revision with patch graft OD  Doing well  +2 cell/flare OD  Resume COAG meds OD  D/c Keto OD today  Increase Lotemax BID OS due to       Return to clinic 1 week       Zioptan qd OU  PF Cosopt BID OU  Alphagan BID OU  Lotemax qd OS    Poly & Durezol QID OD      "

## 2022-06-28 ENCOUNTER — TELEPHONE (OUTPATIENT)
Dept: OPHTHALMOLOGY | Facility: CLINIC | Age: 82
End: 2022-06-28
Payer: MEDICARE

## 2022-06-28 NOTE — TELEPHONE ENCOUNTER
OFFERED PT AN APPT TO COME TODAY TO DR. PICKARD SINCE PT IS IN PAIN  SHE DEFERS APPT TODAY   SHE ONLY WANTS TO SEE DR. MASCORRO AND STATES SHE WILL WAIT UNTIL HER APPT AT 9AM, SHE REQUESTED TO COME EARLY TOMORROW A,M I TOLD HER HER IS BOOKED UNTIL THEN AND TO COME TODAY IF SHE NEEDS TO BE SEEN TODAY. PT STILL DEFERS APPT NOW   WILL USE PRESERVATIVE FREE TEARS AND GEL UNTIL THEN, ALONG WITH HER OTHER MEDS AS INSTRUCTED

## 2022-06-28 NOTE — TELEPHONE ENCOUNTER
----- Message from Joann Madison sent at 6/28/2022 12:22 PM CDT -----  Regarding: calling for Lupe  Contact: pt  Pt states he is in pain.  Please call pt at [phone].

## 2022-06-29 ENCOUNTER — OFFICE VISIT (OUTPATIENT)
Dept: OPHTHALMOLOGY | Facility: CLINIC | Age: 82
End: 2022-06-29
Payer: MEDICARE

## 2022-06-29 DIAGNOSIS — H40.1133 PRIMARY OPEN ANGLE GLAUCOMA (POAG) OF BOTH EYES, SEVERE STAGE: ICD-10-CM

## 2022-06-29 DIAGNOSIS — T81.30XA WOUND DEHISCENCE: ICD-10-CM

## 2022-06-29 DIAGNOSIS — Z98.890 POST-OPERATIVE STATE: Primary | ICD-10-CM

## 2022-06-29 PROCEDURE — 1160F PR REVIEW ALL MEDS BY PRESCRIBER/CLIN PHARMACIST DOCUMENTED: ICD-10-PCS | Mod: CPTII,S$GLB,, | Performed by: OPHTHALMOLOGY

## 2022-06-29 PROCEDURE — 99024 PR POST-OP FOLLOW-UP VISIT: ICD-10-PCS | Mod: S$GLB,,, | Performed by: OPHTHALMOLOGY

## 2022-06-29 PROCEDURE — 1159F PR MEDICATION LIST DOCUMENTED IN MEDICAL RECORD: ICD-10-PCS | Mod: CPTII,S$GLB,, | Performed by: OPHTHALMOLOGY

## 2022-06-29 PROCEDURE — 1160F RVW MEDS BY RX/DR IN RCRD: CPT | Mod: CPTII,S$GLB,, | Performed by: OPHTHALMOLOGY

## 2022-06-29 PROCEDURE — 99999 PR PBB SHADOW E&M-EST. PATIENT-LVL II: CPT | Mod: PBBFAC,,, | Performed by: OPHTHALMOLOGY

## 2022-06-29 PROCEDURE — 99999 PR PBB SHADOW E&M-EST. PATIENT-LVL II: ICD-10-PCS | Mod: PBBFAC,,, | Performed by: OPHTHALMOLOGY

## 2022-06-29 PROCEDURE — 1159F MED LIST DOCD IN RCRD: CPT | Mod: CPTII,S$GLB,, | Performed by: OPHTHALMOLOGY

## 2022-06-29 PROCEDURE — 99024 POSTOP FOLLOW-UP VISIT: CPT | Mod: S$GLB,,, | Performed by: OPHTHALMOLOGY

## 2022-06-29 RX ORDER — NEOMYCIN SULFATE, POLYMYXIN B SULFATE, AND DEXAMETHASONE 3.5; 10000; 1 MG/G; [USP'U]/G; MG/G
OINTMENT OPHTHALMIC EVERY 6 HOURS
Qty: 3.5 G | Refills: 2 | Status: SHIPPED | OUTPATIENT
Start: 2022-06-29 | End: 2024-02-14 | Stop reason: ALTCHOICE

## 2022-06-29 NOTE — PROGRESS NOTES
HPI     Post-op Evaluation     Comments: Wound revision w/ patch graft OD 6/23/22              Comments     States that her vision is stable and that she has been compliant with   gtts as directed. Complains that her OD hurts with a pain scale of 9 and   bernal a lot.    1. Glaucoma OU   Fhx of Glaucoma (2 brothers)   SLT OU 7/9/2020   Trab OU 4/19 in [place]   3x Bleb Needling OU (last one 6/2019)   Ahmed Valve OD 01/21/21   Ahmed OS 9/10/20/ [id number]/REF/FP7/LOT    Wound revision w/ patch graft OD 6/23/22  2. RK OU   3. PCIOL OU   Yag OU   4. 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 (History)            Assessment /Plan     For exam results, see Encounter Report.      ICD-10-CM ICD-9-CM    1. Post-operative state  Z98.890 V45.89    2. Primary open angle glaucoma (POAG) of both eyes, severe stage  H40.1133 365.11      365.73    3. Wound dehiscence  T81.30XA 998.30        S/p wound revision with patch graft OD  Doing well  Tr cell OD  Discussed BCL's will likely not help with irritation from sutures  corneal patch graft in place, 2x1mm conj defect  Taper Durezol and Poly TID      Return to clinic 1 week       Zioptan qd OU  PF Cosopt BID OU  Alphagan BID OU  Lotemax qd OS  Dexacine ointment PRN OD   Poly & Durezol TID OD

## 2022-07-05 ENCOUNTER — OFFICE VISIT (OUTPATIENT)
Dept: OPHTHALMOLOGY | Facility: CLINIC | Age: 82
End: 2022-07-05
Payer: MEDICARE

## 2022-07-05 DIAGNOSIS — T81.30XA WOUND DEHISCENCE: ICD-10-CM

## 2022-07-05 DIAGNOSIS — H40.1133 PRIMARY OPEN ANGLE GLAUCOMA (POAG) OF BOTH EYES, SEVERE STAGE: ICD-10-CM

## 2022-07-05 DIAGNOSIS — Z98.890 POST-OPERATIVE STATE: Primary | ICD-10-CM

## 2022-07-05 PROCEDURE — 99999 PR PBB SHADOW E&M-EST. PATIENT-LVL III: ICD-10-PCS | Mod: PBBFAC,,, | Performed by: OPHTHALMOLOGY

## 2022-07-05 PROCEDURE — 99024 PR POST-OP FOLLOW-UP VISIT: ICD-10-PCS | Mod: S$GLB,,, | Performed by: OPHTHALMOLOGY

## 2022-07-05 PROCEDURE — 1160F PR REVIEW ALL MEDS BY PRESCRIBER/CLIN PHARMACIST DOCUMENTED: ICD-10-PCS | Mod: CPTII,S$GLB,, | Performed by: OPHTHALMOLOGY

## 2022-07-05 PROCEDURE — 1159F MED LIST DOCD IN RCRD: CPT | Mod: CPTII,S$GLB,, | Performed by: OPHTHALMOLOGY

## 2022-07-05 PROCEDURE — 99999 PR PBB SHADOW E&M-EST. PATIENT-LVL III: CPT | Mod: PBBFAC,,, | Performed by: OPHTHALMOLOGY

## 2022-07-05 PROCEDURE — 1159F PR MEDICATION LIST DOCUMENTED IN MEDICAL RECORD: ICD-10-PCS | Mod: CPTII,S$GLB,, | Performed by: OPHTHALMOLOGY

## 2022-07-05 PROCEDURE — 1160F RVW MEDS BY RX/DR IN RCRD: CPT | Mod: CPTII,S$GLB,, | Performed by: OPHTHALMOLOGY

## 2022-07-05 PROCEDURE — 99024 POSTOP FOLLOW-UP VISIT: CPT | Mod: S$GLB,,, | Performed by: OPHTHALMOLOGY

## 2022-07-05 RX ORDER — GATIFLOXACIN 5 MG/ML
1 SOLUTION/ DROPS OPHTHALMIC 2 TIMES DAILY
Qty: 2.5 ML | Refills: 2 | Status: SHIPPED | OUTPATIENT
Start: 2022-07-05 | End: 2022-07-08 | Stop reason: SDUPTHER

## 2022-07-05 RX ORDER — CLONAZEPAM 0.5 MG/1
0.5 TABLET ORAL 2 TIMES DAILY PRN
COMMUNITY
Start: 2022-07-04

## 2022-07-05 NOTE — PROGRESS NOTES
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 Cristofer Worley pt     1. Glaucoma OU   Fhx of Glaucoma (2 brothers)   SLT OU 7/9/2020   Trab OU 4/19 in [place]   3x Bleb Needling OU (last one 6/2019)   Ahmed Valve OD 01/21/21   Ahmed OS 9/10/20/ [id number]/REF/FP7/LOT    Wound revision w/ patch graft OD 6/23/22  2. RK OU   3. PCIOL OU   Yag OU   4. 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 (History)            Assessment /Plan     For exam results, see Encounter Report.      ICD-10-CM ICD-9-CM    1. Post-operative state  Z98.890 V45.89 S/p Revision of Wound -     Sutures removed today OD, slightly more iritis than expected - will increase Durezol today     2. Primary open angle glaucoma (POAG) of both eyes, severe stage  H40.1133 365.11      365.73    3. Wound dehiscence  T81.30XA 998.30        Stop Poly and change to Gati BID OD     Zioptan qd OU  PF Cosopt BID OU  Alphagan BID OU  Lotemax qd OS  Dexacine ointment PRN OD  Durezol QID OD             "

## 2022-07-05 NOTE — Clinical Note
Chandan, This is the patient that I covered the eroded tube with the lamellar corneal patch graft as you recommended. She is 12 days out and the conj still has not fully healed over the donor cornea. I scraped the epithelium off thoroughly so I do not think that is a problem. At what point should I be concerned that it  may not heal? Should I manage her differently? She is using ointment and drops as is typical.  Thanks

## 2022-07-08 ENCOUNTER — TELEPHONE (OUTPATIENT)
Dept: OPHTHALMOLOGY | Facility: CLINIC | Age: 82
End: 2022-07-08
Payer: MEDICARE

## 2022-07-08 ENCOUNTER — OFFICE VISIT (OUTPATIENT)
Dept: OPHTHALMOLOGY | Facility: CLINIC | Age: 82
End: 2022-07-08
Payer: MEDICARE

## 2022-07-08 ENCOUNTER — TELEPHONE (OUTPATIENT)
Dept: OPHTHALMOLOGY | Facility: CLINIC | Age: 82
End: 2022-07-08

## 2022-07-08 DIAGNOSIS — Z98.890 POST-OPERATIVE STATE: Primary | ICD-10-CM

## 2022-07-08 DIAGNOSIS — H40.1133 PRIMARY OPEN ANGLE GLAUCOMA (POAG) OF BOTH EYES, SEVERE STAGE: ICD-10-CM

## 2022-07-08 DIAGNOSIS — H40.1133 PRIMARY OPEN ANGLE GLAUCOMA (POAG) OF BOTH EYES, SEVERE STAGE: Primary | ICD-10-CM

## 2022-07-08 PROCEDURE — 99999 PR PBB SHADOW E&M-EST. PATIENT-LVL III: CPT | Mod: PBBFAC,,, | Performed by: OPHTHALMOLOGY

## 2022-07-08 PROCEDURE — 99024 PR POST-OP FOLLOW-UP VISIT: ICD-10-PCS | Mod: S$GLB,,, | Performed by: OPHTHALMOLOGY

## 2022-07-08 PROCEDURE — 1159F MED LIST DOCD IN RCRD: CPT | Mod: CPTII,S$GLB,, | Performed by: OPHTHALMOLOGY

## 2022-07-08 PROCEDURE — 1160F RVW MEDS BY RX/DR IN RCRD: CPT | Mod: CPTII,S$GLB,, | Performed by: OPHTHALMOLOGY

## 2022-07-08 PROCEDURE — 99024 POSTOP FOLLOW-UP VISIT: CPT | Mod: S$GLB,,, | Performed by: OPHTHALMOLOGY

## 2022-07-08 PROCEDURE — 1159F PR MEDICATION LIST DOCUMENTED IN MEDICAL RECORD: ICD-10-PCS | Mod: CPTII,S$GLB,, | Performed by: OPHTHALMOLOGY

## 2022-07-08 PROCEDURE — 1160F PR REVIEW ALL MEDS BY PRESCRIBER/CLIN PHARMACIST DOCUMENTED: ICD-10-PCS | Mod: CPTII,S$GLB,, | Performed by: OPHTHALMOLOGY

## 2022-07-08 PROCEDURE — 99999 PR PBB SHADOW E&M-EST. PATIENT-LVL III: ICD-10-PCS | Mod: PBBFAC,,, | Performed by: OPHTHALMOLOGY

## 2022-07-08 RX ORDER — GATIFLOXACIN 5 MG/ML
1 SOLUTION/ DROPS OPHTHALMIC 2 TIMES DAILY
Qty: 2.5 ML | Refills: 2 | Status: SHIPPED | OUTPATIENT
Start: 2022-07-08 | End: 2022-10-05 | Stop reason: ALTCHOICE

## 2022-07-08 RX ORDER — TAFLUPROST 0 MG/.3ML
1 SOLUTION/ DROPS OPHTHALMIC DAILY
COMMUNITY
End: 2022-10-25 | Stop reason: SDUPTHER

## 2022-07-08 NOTE — PROGRESS NOTES
HPI     3 day check post revision of wound    No eye pain  Patient states that her eye is getting better.    1. Glaucoma OU   Fhx of Glaucoma (2 brothers)   SLT OU 7/9/2020   Trab OU 4/19 in [place]   3x Bleb Needling OU (last one 6/2019)   Ahmed Valve OD 01/21/21   Ahmed OS 9/10/20/ [id number]/REF/FP7/LOT    Wound revision w/ patch graft OD 6/23/22  2. RK OU   3. PCIOL OU   Yag OU   4. 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 (History)            Assessment /Plan     For exam results, see Encounter Report.      ICD-10-CM ICD-9-CM    1. Post-operative state  Z98.890 V45.89    2. Primary open angle glaucoma (POAG) of both eyes, severe stage  H40.1133 365.11 S/p revision of exposed tube with good coverage of tube - small area without complete coverage that does not appear to be improving.   Also mild corneal edema present today.      365.73        S/p revision of wound OD  Conj defect still present, question of Stromal thinning - would like Dr Cheung to examine the patient.      Gati BID OD  Zioptan qd OU  PF Cosopt BID OU  Alphagan BID OU  Lotemax qd OS  Dexacine ointment PRN OD  Durezol QID OD    Will set up an appointment with Dr. Cheung in the next week or so if available.

## 2022-07-08 NOTE — Clinical Note
This it the patient is the patient that I placed the corneal graft on. The conj defect is still present. Can you take a look at her in the next 7-10 days? We sent the chart to your staff for an appointment.  Unfortunately, she is developing corneal edema post op. I filled the chamber with provisc at the start of the surgery to prevent the tube from touching the endothelium and I irrigated it out at the end. The chamber was stable throughout the procedure. She has seen you in the past and thinks very highly of you. I did tell her she had some corneal edema but I did not go into by discussing it further in hopes that it will improve.  (FYI) Thanks

## 2022-07-08 NOTE — TELEPHONE ENCOUNTER
Verified no drops have changed at this time. Send refill for gati per pt request     ----- Message from Kyra Monroy sent at 7/8/2022 12:07 PM CDT -----  Contact: Abby Wu missed a call. Please call her back at [phone].          Thanks  DD

## 2022-07-08 NOTE — TELEPHONE ENCOUNTER
Spoke with patient, aware of appt time/date     ----- Message from Laurel Kamara MA sent at 7/8/2022  3:47 PM CDT -----  Regarding: RE: post op  Anytime!   :)  ----- Message -----  From: Armando Worthington  Sent: 7/8/2022   3:45 PM CDT  To: Laurel Kamara MA  Subject: RE: post op                                      Great, I will let her know! Thank you!  ----- Message -----  From: Laurel Kamara MA  Sent: 7/8/2022   3:44 PM CDT  To: Doc Merlos MD, Gaurang BARROS Staff  Subject: post op                                          Catracholo,   Dr. Cheung can see this pt on Tuesday at 8:30.     Gina,   Iván   ----- Message -----  From: Doc Merlos MD  Sent: 7/8/2022   3:06 PM CDT  To: Jonatan CORRIGAN Staff    I will be out of the office for the next 2 weeks, and I would like this patient to be seen by Dr Cheung during that time if possible. She is a post op patient of mine. I sent her chart over to Dr Cheung for assessment.     Thank you!  Quincy Merlos

## 2022-07-08 NOTE — Clinical Note
I will be out of the office for the next 2 weeks, and I would like this patient to be seen by Dr Cheung during that time if possible. She is a post op patient of mine. I sent her chart over to Dr Cheung for assessment.   Thank you! Quincy Merlos

## 2022-07-08 NOTE — TELEPHONE ENCOUNTER
Returned patients call, left message for her to call the office.      ----- Message from Myesha Tucker sent at 7/8/2022 11:48 AM CDT -----  Pt is calling rg having questions rg her eye drops with dr gracia and can be rwached at [phone]

## 2022-07-12 ENCOUNTER — TELEPHONE (OUTPATIENT)
Dept: OPHTHALMOLOGY | Facility: CLINIC | Age: 82
End: 2022-07-12
Payer: MEDICARE

## 2022-07-12 ENCOUNTER — OFFICE VISIT (OUTPATIENT)
Dept: OPHTHALMOLOGY | Facility: CLINIC | Age: 82
End: 2022-07-12
Payer: MEDICARE

## 2022-07-12 DIAGNOSIS — Z98.83 GLAUCOMA FILTERING BLEB OF BOTH EYES: ICD-10-CM

## 2022-07-12 DIAGNOSIS — Z96.89 HISTORY OF GLAUCOMA TUBE SHUNT PROCEDURE: Primary | ICD-10-CM

## 2022-07-12 DIAGNOSIS — T15.02XA FOREIGN BODY OF LEFT CORNEA, INITIAL ENCOUNTER: ICD-10-CM

## 2022-07-12 DIAGNOSIS — H40.1133 PRIMARY OPEN ANGLE GLAUCOMA (POAG) OF BOTH EYES, SEVERE STAGE: ICD-10-CM

## 2022-07-12 PROCEDURE — 1126F PR PAIN SEVERITY QUANTIFIED, NO PAIN PRESENT: ICD-10-PCS | Mod: CPTII,S$GLB,, | Performed by: OPHTHALMOLOGY

## 2022-07-12 PROCEDURE — 1159F MED LIST DOCD IN RCRD: CPT | Mod: CPTII,S$GLB,, | Performed by: OPHTHALMOLOGY

## 2022-07-12 PROCEDURE — 99999 PR PBB SHADOW E&M-EST. PATIENT-LVL III: CPT | Mod: PBBFAC,,, | Performed by: OPHTHALMOLOGY

## 2022-07-12 PROCEDURE — 3288F FALL RISK ASSESSMENT DOCD: CPT | Mod: CPTII,S$GLB,, | Performed by: OPHTHALMOLOGY

## 2022-07-12 PROCEDURE — 99999 PR PBB SHADOW E&M-EST. PATIENT-LVL III: ICD-10-PCS | Mod: PBBFAC,,, | Performed by: OPHTHALMOLOGY

## 2022-07-12 PROCEDURE — 1101F PR PT FALLS ASSESS DOC 0-1 FALLS W/OUT INJ PAST YR: ICD-10-PCS | Mod: CPTII,S$GLB,, | Performed by: OPHTHALMOLOGY

## 2022-07-12 PROCEDURE — 99024 POSTOP FOLLOW-UP VISIT: CPT | Mod: S$GLB,,, | Performed by: OPHTHALMOLOGY

## 2022-07-12 PROCEDURE — 99024 PR POST-OP FOLLOW-UP VISIT: ICD-10-PCS | Mod: S$GLB,,, | Performed by: OPHTHALMOLOGY

## 2022-07-12 PROCEDURE — 3288F PR FALLS RISK ASSESSMENT DOCUMENTED: ICD-10-PCS | Mod: CPTII,S$GLB,, | Performed by: OPHTHALMOLOGY

## 2022-07-12 PROCEDURE — 1159F PR MEDICATION LIST DOCUMENTED IN MEDICAL RECORD: ICD-10-PCS | Mod: CPTII,S$GLB,, | Performed by: OPHTHALMOLOGY

## 2022-07-12 PROCEDURE — 1101F PT FALLS ASSESS-DOCD LE1/YR: CPT | Mod: CPTII,S$GLB,, | Performed by: OPHTHALMOLOGY

## 2022-07-12 PROCEDURE — 1126F AMNT PAIN NOTED NONE PRSNT: CPT | Mod: CPTII,S$GLB,, | Performed by: OPHTHALMOLOGY

## 2022-07-12 PROCEDURE — 1160F PR REVIEW ALL MEDS BY PRESCRIBER/CLIN PHARMACIST DOCUMENTED: ICD-10-PCS | Mod: CPTII,S$GLB,, | Performed by: OPHTHALMOLOGY

## 2022-07-12 PROCEDURE — 1160F RVW MEDS BY RX/DR IN RCRD: CPT | Mod: CPTII,S$GLB,, | Performed by: OPHTHALMOLOGY

## 2022-07-12 RX ORDER — DOXYCYCLINE 100 MG/1
100 CAPSULE ORAL 2 TIMES DAILY
COMMUNITY
Start: 2022-03-29

## 2022-07-12 RX ORDER — MUPIROCIN 20 MG/G
OINTMENT TOPICAL
COMMUNITY
Start: 2022-03-29

## 2022-07-12 RX ORDER — BUPROPION HYDROCHLORIDE 150 MG/1
150 TABLET ORAL EVERY MORNING
COMMUNITY
Start: 2022-07-05

## 2022-07-12 NOTE — PROGRESS NOTES
HPI     [facility]   Evaluate and treat glaucoma     1. Glaucoma OU   Fhx of Glaucoma (2 brothers)   SLT OU 7/9/2020   Trab OU 4/19 in [place]   3x Bleb Needling OU (last one 6/2019)   Ahmed Valve OD  01/21/21   Ahmed OS 9/10/20/ [id number]/FP7/LOT    Wound revision w/ patch graft OD 6/23/22   2. RK OU   3. PCIOL OU   Yag OU   4. 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 (History)            Assessment /Plan     For exam results, see Encounter Report.      ICD-10-CM ICD-9-CM    1. Post-operative state  Z98.890 V45.89    2. Primary open angle glaucoma (POAG) of both eyes, severe stage  H40.1133 365.11 S/p revision of exposed tube with good coverage of tube - small area without complete coverage that does not appear to be improving.   Also mild corneal edema present today.      365.73        Retired Anesthesiologist    Complex Ocular Hx    S/p revision of wound OD  ALK Cornea graft in place  No thinning  Good vascularization and clarity  No Infiltrates  No Staining    IOP acceptable      Gati BID OD  Zioptan qd OU  PF Cosopt BID OU  Alphagan BID OU  Lotemax qd OS  Dexacine ointment PRN OD  Durezol QID OD --> BID      Left eye  Loose Nylon suture Sup limbus  Discussed and removed at slit lamp sc diff  Prepped and post with Vig TID x 3 days  RTC sooner prn with good understanding      Plan  RTC with Dr Merlos as scheduled @ 2 weeks  RTC sooner prn with good understanding as discussed

## 2022-07-26 ENCOUNTER — OFFICE VISIT (OUTPATIENT)
Dept: OPHTHALMOLOGY | Facility: CLINIC | Age: 82
End: 2022-07-26
Payer: MEDICARE

## 2022-07-26 DIAGNOSIS — H40.1133 PRIMARY OPEN ANGLE GLAUCOMA (POAG) OF BOTH EYES, SEVERE STAGE: ICD-10-CM

## 2022-07-26 DIAGNOSIS — Z98.890 POST-OPERATIVE STATE: Primary | ICD-10-CM

## 2022-07-26 PROCEDURE — 99024 POSTOP FOLLOW-UP VISIT: CPT | Mod: S$GLB,,, | Performed by: OPHTHALMOLOGY

## 2022-07-26 PROCEDURE — 99999 PR PBB SHADOW E&M-EST. PATIENT-LVL III: CPT | Mod: PBBFAC,,, | Performed by: OPHTHALMOLOGY

## 2022-07-26 PROCEDURE — 99024 PR POST-OP FOLLOW-UP VISIT: ICD-10-PCS | Mod: S$GLB,,, | Performed by: OPHTHALMOLOGY

## 2022-07-26 PROCEDURE — 1159F MED LIST DOCD IN RCRD: CPT | Mod: CPTII,S$GLB,, | Performed by: OPHTHALMOLOGY

## 2022-07-26 PROCEDURE — 1160F PR REVIEW ALL MEDS BY PRESCRIBER/CLIN PHARMACIST DOCUMENTED: ICD-10-PCS | Mod: CPTII,S$GLB,, | Performed by: OPHTHALMOLOGY

## 2022-07-26 PROCEDURE — 1159F PR MEDICATION LIST DOCUMENTED IN MEDICAL RECORD: ICD-10-PCS | Mod: CPTII,S$GLB,, | Performed by: OPHTHALMOLOGY

## 2022-07-26 PROCEDURE — 1160F RVW MEDS BY RX/DR IN RCRD: CPT | Mod: CPTII,S$GLB,, | Performed by: OPHTHALMOLOGY

## 2022-07-26 PROCEDURE — 99999 PR PBB SHADOW E&M-EST. PATIENT-LVL III: ICD-10-PCS | Mod: PBBFAC,,, | Performed by: OPHTHALMOLOGY

## 2022-07-26 RX ORDER — DIFLUPREDNATE OPHTHALMIC 0.5 MG/ML
1 EMULSION OPHTHALMIC 3 TIMES DAILY
Qty: 5 ML | Refills: 2 | Status: SHIPPED | OUTPATIENT
Start: 2022-07-26 | End: 2022-08-05

## 2022-07-26 NOTE — PROGRESS NOTES
"HPI     Post-op Evaluation     Comments: Wound revision w/ patch graft OD 6/23/22    Patient states VA is still not as sharp as it was before the surgery,   denies any pain and using drops as directed. Will need refills if she is   to continue use them. Pt saw Dr Cheung while Dr Merlos was out and he   removed a suture, she states He thought she was doing well               Comments     Dr. Temple, retired anesthesiologist     [facility]     Dr. Cristofer Worley pt     1. Glaucoma OU   Fhx of Glaucoma (2 brothers)   SLT OU 7/9/2020   Trab OU 4/19 in [place]   3x Bleb Needling OU (last one 6/2019)   Ahmed Valve OD 01/21/21   Ahmed OS 9/10/20/ [id number]/FP7/LOT    Wound revision w/ patch graft OD 6/23/22  2. RK OU   3. PCIOL OU   Yag OU   4. 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 (History)            Assessment /Plan     For exam results, see Encounter Report.      ICD-10-CM ICD-9-CM    1. Post-operative state  Z98.890 V45.89    2. Primary open angle glaucoma (POAG) of both eyes, severe stage  H40.1133 365.11      365.73        S/p Wound revision with ALK Graft OD  - doing well   Increase iritis today on exam- will increase Durezol   IOP stable today       Stop Gati today    Zioptan qd OU  PF Cosopt BID OU  Alphagan BID OU      Lotemax qd OS  Dexacine ointment PRN OD  Durezol TID OD         "

## 2022-08-10 ENCOUNTER — OFFICE VISIT (OUTPATIENT)
Dept: OPHTHALMOLOGY | Facility: CLINIC | Age: 82
End: 2022-08-10
Payer: MEDICARE

## 2022-08-10 DIAGNOSIS — Z98.890 POST-OPERATIVE STATE: Primary | ICD-10-CM

## 2022-08-10 DIAGNOSIS — H40.1133 PRIMARY OPEN ANGLE GLAUCOMA (POAG) OF BOTH EYES, SEVERE STAGE: ICD-10-CM

## 2022-08-10 PROCEDURE — 99999 PR PBB SHADOW E&M-EST. PATIENT-LVL III: ICD-10-PCS | Mod: PBBFAC,,, | Performed by: OPHTHALMOLOGY

## 2022-08-10 PROCEDURE — 1159F MED LIST DOCD IN RCRD: CPT | Mod: CPTII,S$GLB,, | Performed by: OPHTHALMOLOGY

## 2022-08-10 PROCEDURE — 99999 PR PBB SHADOW E&M-EST. PATIENT-LVL III: CPT | Mod: PBBFAC,,, | Performed by: OPHTHALMOLOGY

## 2022-08-10 PROCEDURE — 99024 PR POST-OP FOLLOW-UP VISIT: ICD-10-PCS | Mod: S$GLB,,, | Performed by: OPHTHALMOLOGY

## 2022-08-10 PROCEDURE — 1159F PR MEDICATION LIST DOCUMENTED IN MEDICAL RECORD: ICD-10-PCS | Mod: CPTII,S$GLB,, | Performed by: OPHTHALMOLOGY

## 2022-08-10 PROCEDURE — 99024 POSTOP FOLLOW-UP VISIT: CPT | Mod: S$GLB,,, | Performed by: OPHTHALMOLOGY

## 2022-08-10 NOTE — PROGRESS NOTES
HPI     Post-op Evaluation     Comments: Pt reports for 2wk Rtc post op ALK graft OD. Denies any pain or   irritation. Va stable. 100% compliant with gtts.               Comments     1. Glaucoma OU   Fhx of Glaucoma (2 brothers)   SLT OU 7/9/2020   Trab OU 4/19 in [place]   3x Bleb Needling OU (last one 6/2019)   Ahmed Valve OD 01/21/21   Ahmed OS 9/10/20/ [id number]/FP7/LOT    Wound revision w/ patch graft OD 6/23/22  S/P Suture removal OS 07/12/22  2. RK OU   3. PCIOL OU   Yag OU   4. 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 (History)            Assessment /Plan     For exam results, see Encounter Report.      ICD-10-CM ICD-9-CM    1. Post-operative state  Z98.890 V45.89    2. Primary open angle glaucoma (POAG) of both eyes, severe stage  H40.1133 365.11      365.73    Doing well and IOP stable     Trace iritis os today, increase Lotemax to BID today   Zioptan qd OU  PF Cosopt BID OU  Alphagan BID OU     Lotemax BID OS  Dexacine ointment PRN OD  Durezol TID OD    RETURN TO CLINIC 1 month, pt is out of town for 2-3 weeks

## 2022-09-21 ENCOUNTER — TELEPHONE (OUTPATIENT)
Dept: OPHTHALMOLOGY | Facility: CLINIC | Age: 82
End: 2022-09-21
Payer: MEDICARE

## 2022-09-21 NOTE — TELEPHONE ENCOUNTER
----- Message from Myesha Tucker sent at 9/21/2022  3:58 PM CDT -----  Patient is needing a call back regarding her needing to reschedule her appointment with Dr. Merlos to 10/4 in the morning if possible. Please call back at [phone] pt was given next opening states that is not good would like to see with Lupe

## 2022-10-05 ENCOUNTER — OFFICE VISIT (OUTPATIENT)
Dept: OPHTHALMOLOGY | Facility: CLINIC | Age: 82
End: 2022-10-05
Payer: MEDICARE

## 2022-10-05 DIAGNOSIS — H34.8120 CENTRAL RETINAL VEIN OCCLUSION WITH MACULAR EDEMA OF LEFT EYE: ICD-10-CM

## 2022-10-05 DIAGNOSIS — H40.1133 PRIMARY OPEN ANGLE GLAUCOMA (POAG) OF BOTH EYES, SEVERE STAGE: Primary | ICD-10-CM

## 2022-10-05 DIAGNOSIS — H04.123 DRY EYE SYNDROME, BILATERAL: ICD-10-CM

## 2022-10-05 PROCEDURE — 1126F AMNT PAIN NOTED NONE PRSNT: CPT | Mod: CPTII,S$GLB,, | Performed by: OPHTHALMOLOGY

## 2022-10-05 PROCEDURE — 1159F MED LIST DOCD IN RCRD: CPT | Mod: CPTII,S$GLB,, | Performed by: OPHTHALMOLOGY

## 2022-10-05 PROCEDURE — 99214 PR OFFICE/OUTPT VISIT, EST, LEVL IV, 30-39 MIN: ICD-10-PCS | Mod: S$GLB,,, | Performed by: OPHTHALMOLOGY

## 2022-10-05 PROCEDURE — 1160F PR REVIEW ALL MEDS BY PRESCRIBER/CLIN PHARMACIST DOCUMENTED: ICD-10-PCS | Mod: CPTII,S$GLB,, | Performed by: OPHTHALMOLOGY

## 2022-10-05 PROCEDURE — 1160F RVW MEDS BY RX/DR IN RCRD: CPT | Mod: CPTII,S$GLB,, | Performed by: OPHTHALMOLOGY

## 2022-10-05 PROCEDURE — 92133 CPTRZD OPH DX IMG PST SGM ON: CPT | Mod: S$GLB,,, | Performed by: OPHTHALMOLOGY

## 2022-10-05 PROCEDURE — 99999 PR PBB SHADOW E&M-EST. PATIENT-LVL III: CPT | Mod: PBBFAC,,, | Performed by: OPHTHALMOLOGY

## 2022-10-05 PROCEDURE — 1159F PR MEDICATION LIST DOCUMENTED IN MEDICAL RECORD: ICD-10-PCS | Mod: CPTII,S$GLB,, | Performed by: OPHTHALMOLOGY

## 2022-10-05 PROCEDURE — 1126F PR PAIN SEVERITY QUANTIFIED, NO PAIN PRESENT: ICD-10-PCS | Mod: CPTII,S$GLB,, | Performed by: OPHTHALMOLOGY

## 2022-10-05 PROCEDURE — 99214 OFFICE O/P EST MOD 30 MIN: CPT | Mod: S$GLB,,, | Performed by: OPHTHALMOLOGY

## 2022-10-05 PROCEDURE — 92133 POSTERIOR SEGMENT OCT OPTIC NERVE(OCULAR COHERENCE TOMOGRAPHY) - OU - BOTH EYES: ICD-10-PCS | Mod: S$GLB,,, | Performed by: OPHTHALMOLOGY

## 2022-10-05 PROCEDURE — 99999 PR PBB SHADOW E&M-EST. PATIENT-LVL III: ICD-10-PCS | Mod: PBBFAC,,, | Performed by: OPHTHALMOLOGY

## 2022-10-05 RX ORDER — BRIMONIDINE TARTRATE 1 MG/ML
1 SOLUTION/ DROPS OPHTHALMIC 2 TIMES DAILY
Qty: 10 ML | Refills: 3 | Status: SHIPPED | OUTPATIENT
Start: 2022-10-05 | End: 2022-10-25 | Stop reason: SDUPTHER

## 2022-10-05 RX ORDER — DIFLUPREDNATE OPHTHALMIC 0.5 MG/ML
1 EMULSION OPHTHALMIC 2 TIMES DAILY
Qty: 5 ML | Refills: 1 | Status: SHIPPED | OUTPATIENT
Start: 2022-10-05 | End: 2022-10-11 | Stop reason: SDUPTHER

## 2022-10-05 RX ORDER — FLUOROMETHOLONE 1 MG/ML
1 SUSPENSION/ DROPS OPHTHALMIC 2 TIMES DAILY
Qty: 5 ML | Refills: 1 | Status: SHIPPED | OUTPATIENT
Start: 2022-10-05 | End: 2022-10-25 | Stop reason: SDUPTHER

## 2022-10-05 NOTE — PROGRESS NOTES
HPI     Glaucoma            Comments: [facility]          Comments    1. Glaucoma OU   Fhx of Glaucoma (2 brothers)   SLT OU 7/9/2020   Trab OU 4/19 in [place]   3x Bleb Needling OU (last one 6/2019)   Ahmed Valve OD 01/21/21   Ahmed OS 9/10/20/ [id number]/FP7/LOT    Wound revision w/ patch graft OD 6/23/22  S/P Suture removal OS 07/12/22  2. RK OU   3. PCIOL OU   Yag OU   4. 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            Assessment /Plan     For exam results, see Encounter Report.      ICD-10-CM ICD-9-CM    1. Primary open angle glaucoma (POAG) of both eyes, severe stage  H40.1133 365.11 Posterior Segment OCT Optic Nerve- Both eyes     365.73       2. Dry eye syndrome, bilateral  H04.123 375.15       3. Central retinal vein occlusion with macular edema of left eye  H34.8120 362.35      362.83           Stop Lotemax OS and change to FML OS BID     Durezol BID OD   Zioptan qd OU  PF Cosopt BID OU  Alphagan BID OU      RETURN TO CLINIC 3 weeks

## 2022-10-11 RX ORDER — DIFLUPREDNATE OPHTHALMIC 0.5 MG/ML
1 EMULSION OPHTHALMIC 2 TIMES DAILY
Qty: 5 ML | Refills: 1 | Status: SHIPPED | OUTPATIENT
Start: 2022-10-11 | End: 2022-10-25 | Stop reason: SDUPTHER

## 2022-10-12 RX ORDER — PREDNISOLONE ACETATE 10 MG/ML
1 SUSPENSION/ DROPS OPHTHALMIC 2 TIMES DAILY
Qty: 10 ML | Refills: 2 | Status: SHIPPED | OUTPATIENT
Start: 2022-10-12 | End: 2022-10-17 | Stop reason: SDUPTHER

## 2022-10-12 RX ORDER — PREDNISOLONE ACETATE 10 MG/ML
1 SUSPENSION/ DROPS OPHTHALMIC 2 TIMES DAILY
COMMUNITY
End: 2022-10-12 | Stop reason: SDUPTHER

## 2022-10-17 RX ORDER — PREDNISOLONE ACETATE 10 MG/ML
1 SUSPENSION/ DROPS OPHTHALMIC 2 TIMES DAILY
Qty: 10 ML | Refills: 2 | Status: SHIPPED | OUTPATIENT
Start: 2022-10-17 | End: 2024-02-14 | Stop reason: ALTCHOICE

## 2022-10-17 NOTE — TELEPHONE ENCOUNTER
Pred on b/o at [facility], will call in to [facility]. Pt aware of change    ----- Message from Chelsea Wilson sent at 10/17/2022  9:22 AM CDT -----  Contact: Patient, [phone]  Calling because Rx prednisoLONE acetate (PRED FORTE) 1 % DrpS is on back order. Please advise. Thanks.

## 2022-10-25 ENCOUNTER — OFFICE VISIT (OUTPATIENT)
Dept: OPHTHALMOLOGY | Facility: CLINIC | Age: 82
End: 2022-10-25
Payer: MEDICARE

## 2022-10-25 DIAGNOSIS — H04.123 DRY EYE SYNDROME, BILATERAL: ICD-10-CM

## 2022-10-25 DIAGNOSIS — H40.1133 PRIMARY OPEN ANGLE GLAUCOMA (POAG) OF BOTH EYES, SEVERE STAGE: Primary | ICD-10-CM

## 2022-10-25 PROCEDURE — 99999 PR PBB SHADOW E&M-EST. PATIENT-LVL II: CPT | Mod: PBBFAC,,, | Performed by: OPHTHALMOLOGY

## 2022-10-25 PROCEDURE — 99214 OFFICE O/P EST MOD 30 MIN: CPT | Mod: S$GLB,,, | Performed by: OPHTHALMOLOGY

## 2022-10-25 PROCEDURE — 1159F MED LIST DOCD IN RCRD: CPT | Mod: CPTII,S$GLB,, | Performed by: OPHTHALMOLOGY

## 2022-10-25 PROCEDURE — 99999 PR PBB SHADOW E&M-EST. PATIENT-LVL II: ICD-10-PCS | Mod: PBBFAC,,, | Performed by: OPHTHALMOLOGY

## 2022-10-25 PROCEDURE — 1159F PR MEDICATION LIST DOCUMENTED IN MEDICAL RECORD: ICD-10-PCS | Mod: CPTII,S$GLB,, | Performed by: OPHTHALMOLOGY

## 2022-10-25 PROCEDURE — 99214 PR OFFICE/OUTPT VISIT, EST, LEVL IV, 30-39 MIN: ICD-10-PCS | Mod: S$GLB,,, | Performed by: OPHTHALMOLOGY

## 2022-10-25 RX ORDER — FLUOROMETHOLONE 1 MG/ML
1 SUSPENSION/ DROPS OPHTHALMIC 2 TIMES DAILY
Qty: 5 ML | Refills: 1 | Status: SHIPPED | OUTPATIENT
Start: 2022-10-25 | End: 2024-02-14 | Stop reason: ALTCHOICE

## 2022-10-25 RX ORDER — TAFLUPROST 0 MG/.3ML
1 SOLUTION/ DROPS OPHTHALMIC DAILY
Qty: 90 EACH | Refills: 3 | Status: SHIPPED | OUTPATIENT
Start: 2022-10-25

## 2022-10-25 RX ORDER — DORZOLAMIDE HYDROCHLORIDE AND TIMOLOL MALEATE PRESERVATIVE FREE 20; 5 MG/ML; MG/ML
1 SOLUTION/ DROPS OPHTHALMIC 2 TIMES DAILY
Qty: 90 EACH | Refills: 4 | Status: SHIPPED | OUTPATIENT
Start: 2022-10-25 | End: 2023-10-18

## 2022-10-25 RX ORDER — BRIMONIDINE TARTRATE 1 MG/ML
1 SOLUTION/ DROPS OPHTHALMIC 2 TIMES DAILY
Qty: 10 ML | Refills: 3 | Status: SHIPPED | OUTPATIENT
Start: 2022-10-25 | End: 2023-01-11 | Stop reason: SDUPTHER

## 2022-10-25 RX ORDER — DIFLUPREDNATE OPHTHALMIC 0.5 MG/ML
1 EMULSION OPHTHALMIC 2 TIMES DAILY
Qty: 5 ML | Refills: 1 | Status: SHIPPED | OUTPATIENT
Start: 2022-10-25 | End: 2022-12-01

## 2022-10-25 NOTE — PROGRESS NOTES
HPI    Patient here today for 3 week IOP check  Using drops as directed  No pain or discomfort    1. Glaucoma OU   Fhx of Glaucoma (2 brothers)   SLT OU 7/9/2020   Trab OU 4/19 in [place]   3x Bleb Needling OU (last one 6/2019)   Ahmed Valve OD 01/21/21   Ahmed OS 9/10/20/ [id number]/REF/FP7/LOT    Wound revision w/ patch graft OD 6/23/22  S/P Suture removal OS 07/12/22  2. RK OU   3. PCIOL OU   Yag OU   4. 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            Assessment /Plan     For exam results, see Encounter Report.      ICD-10-CM ICD-9-CM    1. Primary open angle glaucoma (POAG) of both eyes, severe stage  H40.1133 365.11      365.73       2. Dry eye syndrome, bilateral  H04.123 375.15 Continue       Doing well - intraocular pressure is within acceptable range relative to target pressure with no evidence of progression.   Continue current treatment.  Reviewed importance of continued compliance with treatment and follow up.       RETURN TO CLINIC 8 weeks IOP         Zioptan qd OU  PF Cosopt BID OU  Alphagan BID OU     Lotemax BID OS  Dexacine ointment PRN OD  Durezol BID OD

## 2022-12-20 ENCOUNTER — OFFICE VISIT (OUTPATIENT)
Dept: OPHTHALMOLOGY | Facility: CLINIC | Age: 82
End: 2022-12-20
Payer: MEDICARE

## 2022-12-20 DIAGNOSIS — H40.1133 PRIMARY OPEN ANGLE GLAUCOMA (POAG) OF BOTH EYES, SEVERE STAGE: Primary | ICD-10-CM

## 2022-12-20 DIAGNOSIS — H34.8120 CENTRAL RETINAL VEIN OCCLUSION WITH MACULAR EDEMA OF LEFT EYE: ICD-10-CM

## 2022-12-20 DIAGNOSIS — Z96.89 HISTORY OF GLAUCOMA TUBE SHUNT PROCEDURE: ICD-10-CM

## 2022-12-20 DIAGNOSIS — H04.123 DRY EYE SYNDROME, BILATERAL: ICD-10-CM

## 2022-12-20 PROCEDURE — 99999 PR PBB SHADOW E&M-EST. PATIENT-LVL III: CPT | Mod: PBBFAC,,, | Performed by: OPHTHALMOLOGY

## 2022-12-20 PROCEDURE — 1159F MED LIST DOCD IN RCRD: CPT | Mod: CPTII,S$GLB,, | Performed by: OPHTHALMOLOGY

## 2022-12-20 PROCEDURE — 99214 OFFICE O/P EST MOD 30 MIN: CPT | Mod: S$GLB,,, | Performed by: OPHTHALMOLOGY

## 2022-12-20 PROCEDURE — 99999 PR PBB SHADOW E&M-EST. PATIENT-LVL III: ICD-10-PCS | Mod: PBBFAC,,, | Performed by: OPHTHALMOLOGY

## 2022-12-20 PROCEDURE — 1159F PR MEDICATION LIST DOCUMENTED IN MEDICAL RECORD: ICD-10-PCS | Mod: CPTII,S$GLB,, | Performed by: OPHTHALMOLOGY

## 2022-12-20 PROCEDURE — 99214 PR OFFICE/OUTPT VISIT, EST, LEVL IV, 30-39 MIN: ICD-10-PCS | Mod: S$GLB,,, | Performed by: OPHTHALMOLOGY

## 2022-12-20 NOTE — PROGRESS NOTES
"HPI     Glaucoma            Comments: Pt reports for 8wk IOP check. Denies any pain or irritation. Va   stable. 100% compliant with gtts.           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 Cristofer Worley pt     1. Glaucoma OU   Fhx of Glaucoma (2 brothers)   SLT OU 7/9/2020   Trab OU 4/19 in [place]   3x Bleb Needling OU (last one 6/2019)   Ahmed Valve OD 01/21/21   Ahmed OS 9/10/20/ [id number]/REF/FP7/LOT    Wound revision w/ patch graft OD 6/23/22  S/P Suture removal OS 07/12/22  2. RK OU   3. PCIOL OU   Yag OU   4. 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            Assessment /Plan     For exam results, see Encounter Report.      ICD-10-CM ICD-9-CM    1. Primary open angle glaucoma (POAG) of both eyes, severe stage  H40.1133 365.11 Follow IOP , likely lower today due to increased iritis      365.73       2. Dry eye syndrome, bilateral  H04.123 375.15 Continue tears       3. Central retinal vein occlusion with macular edema of left eye  H34.8120 362.35      362.83       4. History of glaucoma tube shunt procedure  Z96.89 V45.69           Trace iritis present today OU - can increase steroid dosing today to BID dosing   IOP stable at this time       Zioptan qd OU  PF Cosopt BID OU  Alphagan BID OU     Lotemax BID  OS  Dexacine ointment PRN OD  Durezol BID OD    RETURN TO CLINIC 3-4 weeks IOP         "

## 2023-01-11 ENCOUNTER — OFFICE VISIT (OUTPATIENT)
Dept: OPHTHALMOLOGY | Facility: CLINIC | Age: 83
End: 2023-01-11
Payer: MEDICARE

## 2023-01-11 DIAGNOSIS — H40.1133 PRIMARY OPEN ANGLE GLAUCOMA (POAG) OF BOTH EYES, SEVERE STAGE: Primary | ICD-10-CM

## 2023-01-11 DIAGNOSIS — Z98.890 POST-OPERATIVE STATE: ICD-10-CM

## 2023-01-11 DIAGNOSIS — H34.8120 CENTRAL RETINAL VEIN OCCLUSION WITH MACULAR EDEMA OF LEFT EYE: ICD-10-CM

## 2023-01-11 DIAGNOSIS — H04.123 DRY EYE SYNDROME, BILATERAL: ICD-10-CM

## 2023-01-11 PROCEDURE — 99214 OFFICE O/P EST MOD 30 MIN: CPT | Mod: S$GLB,,, | Performed by: OPHTHALMOLOGY

## 2023-01-11 PROCEDURE — 1159F PR MEDICATION LIST DOCUMENTED IN MEDICAL RECORD: ICD-10-PCS | Mod: CPTII,S$GLB,, | Performed by: OPHTHALMOLOGY

## 2023-01-11 PROCEDURE — 99214 PR OFFICE/OUTPT VISIT, EST, LEVL IV, 30-39 MIN: ICD-10-PCS | Mod: S$GLB,,, | Performed by: OPHTHALMOLOGY

## 2023-01-11 PROCEDURE — 1159F MED LIST DOCD IN RCRD: CPT | Mod: CPTII,S$GLB,, | Performed by: OPHTHALMOLOGY

## 2023-01-11 PROCEDURE — 99999 PR PBB SHADOW E&M-EST. PATIENT-LVL II: CPT | Mod: PBBFAC,,, | Performed by: OPHTHALMOLOGY

## 2023-01-11 PROCEDURE — 99999 PR PBB SHADOW E&M-EST. PATIENT-LVL II: ICD-10-PCS | Mod: PBBFAC,,, | Performed by: OPHTHALMOLOGY

## 2023-01-11 RX ORDER — LOTEPREDNOL ETABONATE 5 MG/ML
1 SUSPENSION/ DROPS OPHTHALMIC 3 TIMES DAILY
Qty: 5 ML | Refills: 2 | Status: SHIPPED | OUTPATIENT
Start: 2023-01-11 | End: 2024-01-11

## 2023-01-11 RX ORDER — BRIMONIDINE TARTRATE 1 MG/ML
1 SOLUTION/ DROPS OPHTHALMIC 2 TIMES DAILY
Qty: 10 ML | Refills: 3 | Status: SHIPPED | OUTPATIENT
Start: 2023-01-11 | End: 2023-03-14 | Stop reason: SDUPTHER

## 2023-01-11 RX ORDER — DIFLUPREDNATE OPHTHALMIC 0.5 MG/ML
1 EMULSION OPHTHALMIC 3 TIMES DAILY
Qty: 5 ML | Refills: 2 | Status: SHIPPED | OUTPATIENT
Start: 2023-01-11 | End: 2023-09-13 | Stop reason: SDUPTHER

## 2023-01-11 NOTE — PROGRESS NOTES
HPI    Pt in today for a 3-4 week IOP check. Pt states her vision has been stable   since her last visit. Denies any ocular pain or discomfort. PT states   she's compliant with her drops.    Dr. Cristofer Worley pt     1. Glaucoma OU   Fhx of Glaucoma (2 brothers)   SLT OU 7/9/2020   Trab OU 4/19 in [place]   3x Bleb Needling OU (last one 6/2019)   Ahmed Valve OD 01/21/21   Ahmed OS 9/10/20/ [id number]/REF/FP7/LOT    Wound revision w/ patch graft OD 6/23/22  S/P Suture removal OS 07/12/22  2. RK OU   3. PCIOL OU   Yag OU   4. 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            Assessment /Plan     For exam results, see Encounter Report.      ICD-10-CM ICD-9-CM    1. Primary open angle glaucoma (POAG) of both eyes, severe stage  H40.1133 365.11      365.73       2. Dry eye syndrome, bilateral  H04.123 375.15       3. Central retinal vein occlusion with macular edema of left eye  H34.8120 362.35      362.83         Follow IOP   Will taper steroids ou today       Zioptan qd OU  PF Cosopt BID OU  Alphagan BID OU     Lotemax QD OS  Dexacine ointment PRN OD  Durezol QD OD    RETURN TO CLINIC 1 month

## 2023-02-08 ENCOUNTER — OFFICE VISIT (OUTPATIENT)
Dept: OPHTHALMOLOGY | Facility: CLINIC | Age: 83
End: 2023-02-08
Payer: MEDICARE

## 2023-02-08 DIAGNOSIS — H40.1133 PRIMARY OPEN ANGLE GLAUCOMA (POAG) OF BOTH EYES, SEVERE STAGE: Primary | ICD-10-CM

## 2023-02-08 DIAGNOSIS — H04.123 DRY EYE SYNDROME, BILATERAL: ICD-10-CM

## 2023-02-08 DIAGNOSIS — H34.8120 CENTRAL RETINAL VEIN OCCLUSION WITH MACULAR EDEMA OF LEFT EYE: ICD-10-CM

## 2023-02-08 PROCEDURE — 92014 PR EYE EXAM, EST PATIENT,COMPREHESV: ICD-10-PCS | Mod: S$GLB,,, | Performed by: OPHTHALMOLOGY

## 2023-02-08 PROCEDURE — 99999 PR PBB SHADOW E&M-EST. PATIENT-LVL III: ICD-10-PCS | Mod: PBBFAC,,, | Performed by: OPHTHALMOLOGY

## 2023-02-08 PROCEDURE — 1160F PR REVIEW ALL MEDS BY PRESCRIBER/CLIN PHARMACIST DOCUMENTED: ICD-10-PCS | Mod: CPTII,S$GLB,, | Performed by: OPHTHALMOLOGY

## 2023-02-08 PROCEDURE — 1159F MED LIST DOCD IN RCRD: CPT | Mod: CPTII,S$GLB,, | Performed by: OPHTHALMOLOGY

## 2023-02-08 PROCEDURE — 1159F PR MEDICATION LIST DOCUMENTED IN MEDICAL RECORD: ICD-10-PCS | Mod: CPTII,S$GLB,, | Performed by: OPHTHALMOLOGY

## 2023-02-08 PROCEDURE — 92014 COMPRE OPH EXAM EST PT 1/>: CPT | Mod: S$GLB,,, | Performed by: OPHTHALMOLOGY

## 2023-02-08 PROCEDURE — 1160F RVW MEDS BY RX/DR IN RCRD: CPT | Mod: CPTII,S$GLB,, | Performed by: OPHTHALMOLOGY

## 2023-02-08 PROCEDURE — 99999 PR PBB SHADOW E&M-EST. PATIENT-LVL III: CPT | Mod: PBBFAC,,, | Performed by: OPHTHALMOLOGY

## 2023-02-08 NOTE — PROGRESS NOTES
"HPI    Pt in today for a 1 month IOP check. Pt states her vision has been stable   since her last visit. Pt states the last couple days there was been a   little shooting pain in her right eye. Pt states she's compliant with her   drops.    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 Cristofer Worley pt     1. Glaucoma OU   Fhx of Glaucoma (2 brothers)   SLT OU 7/9/2020   Trab OU 4/19 in [place]   3x Bleb Needling OU (last one 6/2019)   Ahmed Valve OD 01/21/21   Ahmed OS 9/10/20/ [id number]/REF/FP7/LOT    Wound revision w/ patch graft OD 6/23/22  S/P Suture removal OS 07/12/22  2. RK OU   3. PCIOL OU   Yag OU   4. 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            Assessment /Plan     For exam results, see Encounter Report.      ICD-10-CM ICD-9-CM    1. Primary open angle glaucoma (POAG) of both eyes, severe stage  H40.1133 365.11      365.73       2. Dry eye syndrome, bilateral  H04.123 375.15 Continue Systane Hydration       3. Central retinal vein occlusion with macular edema of left eye  H34.8120 362.35      362.83       Continue following IOP    Zioptan qd OU  PF Cosopt BID OU  Alphagan BID OU     Lotemax QD OS  Dexacine ointment PRN OD  Durezol QD OD    Return to clinic in 1M IOP check                "

## 2023-03-14 ENCOUNTER — OFFICE VISIT (OUTPATIENT)
Dept: OPHTHALMOLOGY | Facility: CLINIC | Age: 83
End: 2023-03-14
Payer: MEDICARE

## 2023-03-14 DIAGNOSIS — H35.353 CME (CYSTOID MACULAR EDEMA), BILATERAL: ICD-10-CM

## 2023-03-14 DIAGNOSIS — H34.8120 CENTRAL RETINAL VEIN OCCLUSION WITH MACULAR EDEMA OF LEFT EYE: ICD-10-CM

## 2023-03-14 DIAGNOSIS — H04.123 DRY EYE SYNDROME, BILATERAL: ICD-10-CM

## 2023-03-14 DIAGNOSIS — H40.1133 PRIMARY OPEN ANGLE GLAUCOMA (POAG) OF BOTH EYES, SEVERE STAGE: Primary | ICD-10-CM

## 2023-03-14 PROCEDURE — 1159F PR MEDICATION LIST DOCUMENTED IN MEDICAL RECORD: ICD-10-PCS | Mod: CPTII,S$GLB,, | Performed by: OPHTHALMOLOGY

## 2023-03-14 PROCEDURE — 92134 POSTERIOR SEGMENT OCT RETINA (OCULAR COHERENCE TOMOGRAPHY)-BOTH EYES: ICD-10-PCS | Mod: S$GLB,,, | Performed by: OPHTHALMOLOGY

## 2023-03-14 PROCEDURE — 1159F MED LIST DOCD IN RCRD: CPT | Mod: CPTII,S$GLB,, | Performed by: OPHTHALMOLOGY

## 2023-03-14 PROCEDURE — 1160F PR REVIEW ALL MEDS BY PRESCRIBER/CLIN PHARMACIST DOCUMENTED: ICD-10-PCS | Mod: CPTII,S$GLB,, | Performed by: OPHTHALMOLOGY

## 2023-03-14 PROCEDURE — 99214 PR OFFICE/OUTPT VISIT, EST, LEVL IV, 30-39 MIN: ICD-10-PCS | Mod: S$GLB,,, | Performed by: OPHTHALMOLOGY

## 2023-03-14 PROCEDURE — 99999 PR PBB SHADOW E&M-EST. PATIENT-LVL III: ICD-10-PCS | Mod: PBBFAC,,, | Performed by: OPHTHALMOLOGY

## 2023-03-14 PROCEDURE — 99214 OFFICE O/P EST MOD 30 MIN: CPT | Mod: S$GLB,,, | Performed by: OPHTHALMOLOGY

## 2023-03-14 PROCEDURE — 1160F RVW MEDS BY RX/DR IN RCRD: CPT | Mod: CPTII,S$GLB,, | Performed by: OPHTHALMOLOGY

## 2023-03-14 PROCEDURE — 92134 CPTRZ OPH DX IMG PST SGM RTA: CPT | Mod: S$GLB,,, | Performed by: OPHTHALMOLOGY

## 2023-03-14 PROCEDURE — 99999 PR PBB SHADOW E&M-EST. PATIENT-LVL III: CPT | Mod: PBBFAC,,, | Performed by: OPHTHALMOLOGY

## 2023-03-14 RX ORDER — BRIMONIDINE TARTRATE 1 MG/ML
1 SOLUTION/ DROPS OPHTHALMIC 2 TIMES DAILY
Qty: 10 ML | Refills: 3 | Status: SHIPPED | OUTPATIENT
Start: 2023-03-14 | End: 2023-06-21 | Stop reason: SDUPTHER

## 2023-03-14 RX ORDER — BROMFENAC SODIUM 0.7 MG/ML
1 SOLUTION/ DROPS OPHTHALMIC DAILY
Qty: 3 ML | Refills: 6 | Status: SHIPPED | OUTPATIENT
Start: 2023-03-14 | End: 2023-09-18 | Stop reason: SDUPTHER

## 2023-03-14 NOTE — PROGRESS NOTES
"HPI     Glaucoma            Comments: Patient here for IOP check. Patient states she is currently   using drops as directed. Patient denies pain and discomfort.           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 Cristofer Worley pt     1. Glaucoma OU   Fhx of Glaucoma (2 brothers)   SLT OU 7/9/2020   Trab OU 4/19 in [place]   3x Bleb Needling OU (last one 6/2019)   Ahmed Valve OD 01/21/21   Ahmed OS 9/10/20/ [id number]/REF/FP7/LOT    Wound revision w/ patch graft OD 6/23/22  S/P Suture removal OS 07/12/22  2. RK OU   3. PCIOL OU   Yag OU   4. 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            Assessment /Plan     For exam results, see Encounter Report.      ICD-10-CM ICD-9-CM    1. Primary open angle glaucoma (POAG) of both eyes, severe stage  H40.1133 365.11 Excellent iop but CME present OU  brimonidine 0.1% (ALPHAGAN P) 0.1 % Drop     365.73 bromfenac (PROLENSA) 0.07 % Drop      2. Dry eye syndrome, bilateral  H04.123 375.15 Tolerating drops      3. Central retinal vein occlusion with macular edema of left eye  H34.8120 362.35 bromfenac (PROLENSA) 0.07 % Drop     362.83       4. CME (cystoid macular edema), bilateral  H35.353 362.53 Present OU, will stop Zioptan and see if there is improvement.           Start Prolensa qd OU  Stop zioptan  PF Cosopt BID OU  Alphagan BID OU  Durezol qd OD  Lotemax QD OS    RETURN TO CLINIC 1 month IOP, moct and MR             "

## 2023-04-12 ENCOUNTER — OFFICE VISIT (OUTPATIENT)
Dept: OPHTHALMOLOGY | Facility: CLINIC | Age: 83
End: 2023-04-12
Payer: MEDICARE

## 2023-04-12 DIAGNOSIS — H40.1133 PRIMARY OPEN ANGLE GLAUCOMA (POAG) OF BOTH EYES, SEVERE STAGE: Primary | ICD-10-CM

## 2023-04-12 DIAGNOSIS — H20.9 IRITIS OF BOTH EYES: ICD-10-CM

## 2023-04-12 DIAGNOSIS — H52.7 REFRACTIVE ERROR: ICD-10-CM

## 2023-04-12 DIAGNOSIS — H35.353 CME (CYSTOID MACULAR EDEMA), BILATERAL: ICD-10-CM

## 2023-04-12 PROCEDURE — 1160F PR REVIEW ALL MEDS BY PRESCRIBER/CLIN PHARMACIST DOCUMENTED: ICD-10-PCS | Mod: CPTII,S$GLB,, | Performed by: OPHTHALMOLOGY

## 2023-04-12 PROCEDURE — 99213 PR OFFICE/OUTPT VISIT, EST, LEVL III, 20-29 MIN: ICD-10-PCS | Mod: S$GLB,,, | Performed by: OPHTHALMOLOGY

## 2023-04-12 PROCEDURE — 99999 PR PBB SHADOW E&M-EST. PATIENT-LVL III: ICD-10-PCS | Mod: PBBFAC,,, | Performed by: OPHTHALMOLOGY

## 2023-04-12 PROCEDURE — 1159F PR MEDICATION LIST DOCUMENTED IN MEDICAL RECORD: ICD-10-PCS | Mod: CPTII,S$GLB,, | Performed by: OPHTHALMOLOGY

## 2023-04-12 PROCEDURE — 1160F RVW MEDS BY RX/DR IN RCRD: CPT | Mod: CPTII,S$GLB,, | Performed by: OPHTHALMOLOGY

## 2023-04-12 PROCEDURE — 92134 CPTRZ OPH DX IMG PST SGM RTA: CPT | Mod: S$GLB,,, | Performed by: OPHTHALMOLOGY

## 2023-04-12 PROCEDURE — 92134 POSTERIOR SEGMENT OCT RETINA (OCULAR COHERENCE TOMOGRAPHY)-BOTH EYES: ICD-10-PCS | Mod: S$GLB,,, | Performed by: OPHTHALMOLOGY

## 2023-04-12 PROCEDURE — 99999 PR PBB SHADOW E&M-EST. PATIENT-LVL III: CPT | Mod: PBBFAC,,, | Performed by: OPHTHALMOLOGY

## 2023-04-12 PROCEDURE — 1159F MED LIST DOCD IN RCRD: CPT | Mod: CPTII,S$GLB,, | Performed by: OPHTHALMOLOGY

## 2023-04-12 PROCEDURE — 99213 OFFICE O/P EST LOW 20 MIN: CPT | Mod: S$GLB,,, | Performed by: OPHTHALMOLOGY

## 2023-04-12 RX ORDER — BROMFENAC SODIUM 0.7 MG/ML
1 SOLUTION/ DROPS OPHTHALMIC DAILY
Qty: 3 ML | Refills: 3 | Status: SHIPPED | OUTPATIENT
Start: 2023-04-12 | End: 2023-06-21 | Stop reason: SDUPTHER

## 2023-04-12 RX ORDER — LOTEPREDNOL ETABONATE 5 MG/ML
1 SUSPENSION/ DROPS OPHTHALMIC DAILY
Qty: 5 ML | Refills: 3 | Status: SHIPPED | OUTPATIENT
Start: 2023-04-12 | End: 2024-02-14 | Stop reason: SDUPTHER

## 2023-04-12 RX ORDER — PREDNISOLONE ACETATE 10 MG/ML
1 SUSPENSION/ DROPS OPHTHALMIC DAILY
Qty: 5 ML | Refills: 3 | Status: SHIPPED | OUTPATIENT
Start: 2023-04-12 | End: 2024-02-14 | Stop reason: ALTCHOICE

## 2023-04-12 NOTE — PROGRESS NOTES
"HPI     Glaucoma            Comments: Patient reports for 1 month IOP check. Using gtts as advised.   Denies pain or irritation at this time. Patient reports of visual   stability since previous visit.             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 Cristofer Worley pt     1. Glaucoma OU   Fhx of Glaucoma (2 brothers)   SLT OU 7/9/2020   Trab OU 4/19 in [place]   3x Bleb Needling OU (last one 6/2019)   Ahmed Valve OD 01/21/21   Ahmed OS 9/10/20/ [id number]/REF/FP7/LOT    Wound revision w/ patch graft OD 6/23/22  S/P Suture removal OS 07/12/22  2. RK OU   3. PCIOL OU   Yag OU   4. 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            Assessment /Plan     For exam results, see Encounter Report.      ICD-10-CM ICD-9-CM    1. Primary open angle glaucoma (POAG) of both eyes, severe stage  H40.1133 365.11 Doing well - intraocular pressure is within acceptable range relative to target pressure with no evidence of progression.   Continue current treatment.  Reviewed importance of continued compliance with treatment and follow up.        365.73       2. Iritis of both eyes  H20.9 364.3 Stable at this time, pt inquiring if she can stop Pred- advised at this time it is not recommended to d/c medication as it's likely condition will worsen without use. She understands and will continue.       3. CME (cystoid macular edema), bilateral  H35.353 362.53 Posterior Segment OCT Retina-Both eyes    Improved on exam today, continue Prolensa      4. Refractive error  H52.7 367.9 Refraction done today, no change from wearing RX          Return to clinic 1 month         Prolensa qd OU  PF Cosopt BID " OU  Alphagan BID OU  Pred qd OD (recommend to shake bottle)  Lotemax QD OS

## 2023-05-17 ENCOUNTER — OFFICE VISIT (OUTPATIENT)
Dept: OPHTHALMOLOGY | Facility: CLINIC | Age: 83
End: 2023-05-17
Payer: MEDICARE

## 2023-05-17 DIAGNOSIS — H35.353 CME (CYSTOID MACULAR EDEMA), BILATERAL: ICD-10-CM

## 2023-05-17 DIAGNOSIS — H04.123 DRY EYE SYNDROME, BILATERAL: ICD-10-CM

## 2023-05-17 DIAGNOSIS — H40.1133 PRIMARY OPEN ANGLE GLAUCOMA (POAG) OF BOTH EYES, SEVERE STAGE: Primary | ICD-10-CM

## 2023-05-17 DIAGNOSIS — H20.9 IRITIS OF BOTH EYES: ICD-10-CM

## 2023-05-17 PROCEDURE — 1159F MED LIST DOCD IN RCRD: CPT | Mod: CPTII,,, | Performed by: OPHTHALMOLOGY

## 2023-05-17 PROCEDURE — 92250 COLOR FUNDUS PHOTOGRAPHY - OU - BOTH EYES: ICD-10-PCS | Mod: ,,, | Performed by: OPHTHALMOLOGY

## 2023-05-17 PROCEDURE — 1159F PR MEDICATION LIST DOCUMENTED IN MEDICAL RECORD: ICD-10-PCS | Mod: CPTII,,, | Performed by: OPHTHALMOLOGY

## 2023-05-17 PROCEDURE — 1160F RVW MEDS BY RX/DR IN RCRD: CPT | Mod: CPTII,,, | Performed by: OPHTHALMOLOGY

## 2023-05-17 PROCEDURE — 99999 PR PBB SHADOW E&M-EST. PATIENT-LVL III: CPT | Mod: PBBFAC,,, | Performed by: OPHTHALMOLOGY

## 2023-05-17 PROCEDURE — 99213 OFFICE O/P EST LOW 20 MIN: CPT | Performed by: OPHTHALMOLOGY

## 2023-05-17 PROCEDURE — 99999 PR PBB SHADOW E&M-EST. PATIENT-LVL III: ICD-10-PCS | Mod: PBBFAC,,, | Performed by: OPHTHALMOLOGY

## 2023-05-17 PROCEDURE — 99214 OFFICE O/P EST MOD 30 MIN: CPT | Mod: ,,, | Performed by: OPHTHALMOLOGY

## 2023-05-17 PROCEDURE — 99214 PR OFFICE/OUTPT VISIT, EST, LEVL IV, 30-39 MIN: ICD-10-PCS | Mod: ,,, | Performed by: OPHTHALMOLOGY

## 2023-05-17 PROCEDURE — 92250 FUNDUS PHOTOGRAPHY W/I&R: CPT | Mod: ,,, | Performed by: OPHTHALMOLOGY

## 2023-05-17 PROCEDURE — 1160F PR REVIEW ALL MEDS BY PRESCRIBER/CLIN PHARMACIST DOCUMENTED: ICD-10-PCS | Mod: CPTII,,, | Performed by: OPHTHALMOLOGY

## 2023-05-17 RX ORDER — DULOXETIN HYDROCHLORIDE 60 MG/1
60 CAPSULE, DELAYED RELEASE ORAL
COMMUNITY
Start: 2023-04-11

## 2023-05-17 NOTE — PROGRESS NOTES
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 Cristofer Worley pt     1. Glaucoma OU   Fhx of Glaucoma (2 brothers)   SLT OU 7/9/2020   Trab OU 4/19 in [place]   3x Bleb Needling OU (last one 6/2019)   Ahmed Valve OD 01/21/21   Ahmed OS 9/10/20/ [id number]/FP7/LOT    Wound revision w/ patch graft OD 6/23/22  S/P Suture removal OS 07/12/22  2. RK OU   3. PCIOL OU   Yag OU   4. 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            Assessment /Plan     For exam results, see Encounter Report.      ICD-10-CM ICD-9-CM    1. Primary open angle glaucoma (POAG) of both eyes, severe stage  H40.1133 365.11 Doing well - intraocular pressure is within acceptable range relative to target pressure with no evidence of progression.   Continue current treatment.  Reviewed importance of continued compliance with treatment and follow up.      365.73       2. Iritis of both eyes  H20.9 364.3 Stable on Pred       3. CME (cystoid macular edema), bilateral  H35.353 362.53 Continue Prolensa       4. Dry eye syndrome, bilateral  H04.123 375.15 Findings and symptoms consistent with mild dry eyes.   Recommend regular use of Artificial Tears. These should be thought of as Ocular Surface Moisturizers similar to skin moisturizers in that regular use is required to achieve maximum benefit.  Specifically, I recommend the following:    Systane Ultra or Refresh Optive Eamon 3 : 3-4 times a day.   The first dose upon " awakening is most important because we do not make tears at night.  Avoid generic products as they contain Benzalkonium Chloride as a preservative. This is a very irritating chemical and can make your eyes worse.    Omega 3 Fish Oils  1000 to 2000 mgs per day of Nordic Naturals or PRN Dry Eye formula [employer]          Return to clinic 1 month per pt request for IOP check        Prolensa qd OU  PF Cosopt BID OU  Alphagan BID OU  Pred qd OD (recommend to shake bottle)  Lotemax QD OS

## 2023-06-21 ENCOUNTER — OFFICE VISIT (OUTPATIENT)
Dept: OPHTHALMOLOGY | Facility: CLINIC | Age: 83
End: 2023-06-21
Payer: MEDICARE

## 2023-06-21 DIAGNOSIS — H20.9 IRITIS OF BOTH EYES: ICD-10-CM

## 2023-06-21 DIAGNOSIS — H04.123 DRY EYE SYNDROME, BILATERAL: ICD-10-CM

## 2023-06-21 DIAGNOSIS — H40.1133 PRIMARY OPEN ANGLE GLAUCOMA (POAG) OF BOTH EYES, SEVERE STAGE: Primary | ICD-10-CM

## 2023-06-21 DIAGNOSIS — H35.353 CME (CYSTOID MACULAR EDEMA), BILATERAL: ICD-10-CM

## 2023-06-21 PROCEDURE — 92134 CPTRZ OPH DX IMG PST SGM RTA: CPT | Mod: S$GLB,,, | Performed by: OPHTHALMOLOGY

## 2023-06-21 PROCEDURE — 1160F PR REVIEW ALL MEDS BY PRESCRIBER/CLIN PHARMACIST DOCUMENTED: ICD-10-PCS | Mod: CPTII,S$GLB,, | Performed by: OPHTHALMOLOGY

## 2023-06-21 PROCEDURE — 99214 PR OFFICE/OUTPT VISIT, EST, LEVL IV, 30-39 MIN: ICD-10-PCS | Mod: S$GLB,,, | Performed by: OPHTHALMOLOGY

## 2023-06-21 PROCEDURE — 1159F MED LIST DOCD IN RCRD: CPT | Mod: CPTII,S$GLB,, | Performed by: OPHTHALMOLOGY

## 2023-06-21 PROCEDURE — 1160F RVW MEDS BY RX/DR IN RCRD: CPT | Mod: CPTII,S$GLB,, | Performed by: OPHTHALMOLOGY

## 2023-06-21 PROCEDURE — 99999 PR PBB SHADOW E&M-EST. PATIENT-LVL III: ICD-10-PCS | Mod: PBBFAC,,, | Performed by: OPHTHALMOLOGY

## 2023-06-21 PROCEDURE — 92134 POSTERIOR SEGMENT OCT RETINA (OCULAR COHERENCE TOMOGRAPHY)-BOTH EYES: ICD-10-PCS | Mod: S$GLB,,, | Performed by: OPHTHALMOLOGY

## 2023-06-21 PROCEDURE — 1159F PR MEDICATION LIST DOCUMENTED IN MEDICAL RECORD: ICD-10-PCS | Mod: CPTII,S$GLB,, | Performed by: OPHTHALMOLOGY

## 2023-06-21 PROCEDURE — 99214 OFFICE O/P EST MOD 30 MIN: CPT | Mod: S$GLB,,, | Performed by: OPHTHALMOLOGY

## 2023-06-21 PROCEDURE — 99999 PR PBB SHADOW E&M-EST. PATIENT-LVL III: CPT | Mod: PBBFAC,,, | Performed by: OPHTHALMOLOGY

## 2023-06-21 RX ORDER — BRIMONIDINE TARTRATE 1 MG/ML
1 SOLUTION/ DROPS OPHTHALMIC 2 TIMES DAILY
Qty: 10 ML | Refills: 3 | Status: SHIPPED | OUTPATIENT
Start: 2023-06-21 | End: 2023-09-13 | Stop reason: SDUPTHER

## 2023-06-21 RX ORDER — BROMFENAC SODIUM 0.7 MG/ML
1 SOLUTION/ DROPS OPHTHALMIC DAILY
Qty: 3 ML | Refills: 3 | Status: SHIPPED | OUTPATIENT
Start: 2023-06-21 | End: 2023-09-13 | Stop reason: SDUPTHER

## 2023-06-21 NOTE — PROGRESS NOTES
HPI     Glaucoma            Comments: Pt reports for 1m IOP check. Denies any pain or irritation. Va   stable. 100% compliant with gtts.           Comments      1. Glaucoma OU   Fhx of Glaucoma (2 brothers)   SLT OU 7/9/2020   Trab OU 4/19 in [place]   3x Bleb Needling OU (last one 6/2019)   Ahmed Valve OD 01/21/21   Ahmed OS 9/10/20/ [id number]/FP7/LOT    Wound revision w/ patch graft OD 6/23/22  S/P Suture removal OS 07/12/22  2. RK OU   3. PCIOL OU   Yag OU   4. 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            Assessment /Plan     For exam results, see Encounter Report.      ICD-10-CM ICD-9-CM    1. Primary open angle glaucoma (POAG) of both eyes, severe stage  H40.1133 365.11 brimonidine 0.1% (ALPHAGAN P) 0.1 % Drop    Doing well - intraocular pressure is within acceptable range relative to target pressure with no evidence of progression.   Continue current treatment.  Reviewed importance of continued compliance with treatment and follow up.      365.73       2. Iritis of both eyes  H20.9 364.3 Stable on Pred       3. CME (cystoid macular edema), bilateral  H35.353 362.53 Posterior Segment OCT Retina-Both eyes    Improved today, though still some intraretinal fluid remains. Patient would like to d/c drop- advised even though improvement is noted, it is not recommended to stop Prolensa. Pt understands.      4. Dry eye syndrome, bilateral  H04.123 375.15           Return to clinic 2 months for IOP      Prolensa qd OU  PF Cosopt BID OU  Alphagan BID OU  Pred qd OD (recommend to shake bottle)  Lotemax QD OS

## 2023-09-13 ENCOUNTER — OFFICE VISIT (OUTPATIENT)
Dept: OPHTHALMOLOGY | Facility: CLINIC | Age: 83
End: 2023-09-13
Payer: MEDICARE

## 2023-09-13 DIAGNOSIS — H20.9 IRITIS OF BOTH EYES: ICD-10-CM

## 2023-09-13 DIAGNOSIS — H40.1133 PRIMARY OPEN ANGLE GLAUCOMA (POAG) OF BOTH EYES, SEVERE STAGE: Primary | ICD-10-CM

## 2023-09-13 DIAGNOSIS — H35.353 CME (CYSTOID MACULAR EDEMA), BILATERAL: ICD-10-CM

## 2023-09-13 DIAGNOSIS — H04.123 DRY EYE SYNDROME, BILATERAL: ICD-10-CM

## 2023-09-13 PROCEDURE — 99214 PR OFFICE/OUTPT VISIT, EST, LEVL IV, 30-39 MIN: ICD-10-PCS | Mod: S$GLB,,, | Performed by: OPHTHALMOLOGY

## 2023-09-13 PROCEDURE — 1159F PR MEDICATION LIST DOCUMENTED IN MEDICAL RECORD: ICD-10-PCS | Mod: CPTII,S$GLB,, | Performed by: OPHTHALMOLOGY

## 2023-09-13 PROCEDURE — 92133 CPTRZD OPH DX IMG PST SGM ON: CPT | Mod: S$GLB,,, | Performed by: OPHTHALMOLOGY

## 2023-09-13 PROCEDURE — 92133 POSTERIOR SEGMENT OCT OPTIC NERVE(OCULAR COHERENCE TOMOGRAPHY) - OU - BOTH EYES: ICD-10-PCS | Mod: S$GLB,,, | Performed by: OPHTHALMOLOGY

## 2023-09-13 PROCEDURE — 1160F RVW MEDS BY RX/DR IN RCRD: CPT | Mod: CPTII,S$GLB,, | Performed by: OPHTHALMOLOGY

## 2023-09-13 PROCEDURE — 1159F MED LIST DOCD IN RCRD: CPT | Mod: CPTII,S$GLB,, | Performed by: OPHTHALMOLOGY

## 2023-09-13 PROCEDURE — 99214 OFFICE O/P EST MOD 30 MIN: CPT | Mod: S$GLB,,, | Performed by: OPHTHALMOLOGY

## 2023-09-13 PROCEDURE — 1160F PR REVIEW ALL MEDS BY PRESCRIBER/CLIN PHARMACIST DOCUMENTED: ICD-10-PCS | Mod: CPTII,S$GLB,, | Performed by: OPHTHALMOLOGY

## 2023-09-13 PROCEDURE — 99999 PR PBB SHADOW E&M-EST. PATIENT-LVL I: ICD-10-PCS | Mod: PBBFAC,,, | Performed by: OPHTHALMOLOGY

## 2023-09-13 PROCEDURE — 99999 PR PBB SHADOW E&M-EST. PATIENT-LVL I: CPT | Mod: PBBFAC,,, | Performed by: OPHTHALMOLOGY

## 2023-09-13 RX ORDER — DIFLUPREDNATE OPHTHALMIC 0.5 MG/ML
1 EMULSION OPHTHALMIC 3 TIMES DAILY
Qty: 5 ML | Refills: 2 | Status: SHIPPED | OUTPATIENT
Start: 2023-09-13 | End: 2024-02-14 | Stop reason: SDUPTHER

## 2023-09-13 RX ORDER — BRIMONIDINE TARTRATE 1 MG/ML
1 SOLUTION/ DROPS OPHTHALMIC 2 TIMES DAILY
Qty: 10 ML | Refills: 3 | Status: SHIPPED | OUTPATIENT
Start: 2023-09-13 | End: 2024-10-17

## 2023-09-13 RX ORDER — BROMFENAC SODIUM 0.7 MG/ML
1 SOLUTION/ DROPS OPHTHALMIC DAILY
Qty: 3 ML | Refills: 3 | Status: SHIPPED | OUTPATIENT
Start: 2023-09-13 | End: 2024-02-14 | Stop reason: SDUPTHER

## 2023-09-13 NOTE — PROGRESS NOTES
"HPI     Glaucoma            Comments: Pt in today for pressure check. Pt states va is stable. Pt   states she is using her eye drops as directed.          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 Cristofer Worley pt     1. Glaucoma OU   Fhx of Glaucoma (2 brothers)   SLT OU 7/9/2020   Trab OU 4/19 in [place]   3x Bleb Needling OU (last one 6/2019)   Ahmed Valve OD 01/21/21   Ahmed OS 9/10/20/ [id number]/REF/FP7/LOT    Wound revision w/ patch graft OD 6/23/22  S/P Suture removal OS 07/12/22  2. RK OU   3. PCIOL OU   Yag OU   4. 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            Assessment /Plan     For exam results, see Encounter Report.      ICD-10-CM ICD-9-CM    1. Primary open angle glaucoma (POAG) of both eyes, severe stage  H40.1133 365.11 Doing well - intraocular pressure is within acceptable range relative to target pressure with no evidence of progression.   Continue current treatment.  Reviewed importance of continued compliance with treatment and follow up.        365.73       2. Iritis of both eyes  H20.9 364.3 Continue steroid       3. CME (cystoid macular edema), bilateral  H35.353 362.53 Still present OU       4. Dry eye syndrome, bilateral  H04.123 375.15           RETURN TO CLINIC 3-4 month IOP     Prolensa qd OU  PF Cosopt BID OU  Alphagan BID OU  Pred qd OD (recommend to shake bottle), pt desires to change back to Durezol OD - new script sent today as per her request   Lotemax QD OS              "

## 2023-09-18 DIAGNOSIS — H34.8120 CENTRAL RETINAL VEIN OCCLUSION WITH MACULAR EDEMA OF LEFT EYE: ICD-10-CM

## 2023-09-18 DIAGNOSIS — H40.1133 PRIMARY OPEN ANGLE GLAUCOMA (POAG) OF BOTH EYES, SEVERE STAGE: ICD-10-CM

## 2023-09-18 RX ORDER — BROMFENAC SODIUM 0.7 MG/ML
1 SOLUTION/ DROPS OPHTHALMIC DAILY
Qty: 3 ML | Refills: 6 | Status: SHIPPED | OUTPATIENT
Start: 2023-09-18 | End: 2024-02-14 | Stop reason: SDUPTHER

## 2023-10-18 DIAGNOSIS — H40.1133 PRIMARY OPEN ANGLE GLAUCOMA (POAG) OF BOTH EYES, SEVERE STAGE: ICD-10-CM

## 2023-10-18 RX ORDER — DORZOLAMIDE HYDROCHLORIDE AND TIMOLOL MALEATE 20; 5 MG/ML; MG/ML
SOLUTION/ DROPS OPHTHALMIC
Qty: 180 EACH | Refills: 3 | Status: SHIPPED | OUTPATIENT
Start: 2023-10-18

## 2023-12-12 ENCOUNTER — OFFICE VISIT (OUTPATIENT)
Dept: OPHTHALMOLOGY | Facility: CLINIC | Age: 83
End: 2023-12-12
Payer: MEDICARE

## 2023-12-12 DIAGNOSIS — H04.123 DRY EYE SYNDROME, BILATERAL: ICD-10-CM

## 2023-12-12 DIAGNOSIS — H40.1133 PRIMARY OPEN ANGLE GLAUCOMA (POAG) OF BOTH EYES, SEVERE STAGE: Primary | ICD-10-CM

## 2023-12-12 DIAGNOSIS — H35.353 CME (CYSTOID MACULAR EDEMA), BILATERAL: ICD-10-CM

## 2023-12-12 DIAGNOSIS — H20.9 IRITIS OF BOTH EYES: ICD-10-CM

## 2023-12-12 PROCEDURE — 92134 CPTRZ OPH DX IMG PST SGM RTA: CPT | Mod: S$GLB,,, | Performed by: OPHTHALMOLOGY

## 2023-12-12 PROCEDURE — 99214 OFFICE O/P EST MOD 30 MIN: CPT | Mod: S$GLB,,, | Performed by: OPHTHALMOLOGY

## 2023-12-12 PROCEDURE — 99999 PR PBB SHADOW E&M-EST. PATIENT-LVL II: CPT | Mod: PBBFAC,,, | Performed by: OPHTHALMOLOGY

## 2023-12-12 PROCEDURE — 99999 PR PBB SHADOW E&M-EST. PATIENT-LVL II: ICD-10-PCS | Mod: PBBFAC,,, | Performed by: OPHTHALMOLOGY

## 2023-12-12 PROCEDURE — 99214 PR OFFICE/OUTPT VISIT, EST, LEVL IV, 30-39 MIN: ICD-10-PCS | Mod: S$GLB,,, | Performed by: OPHTHALMOLOGY

## 2023-12-12 PROCEDURE — 92134 POSTERIOR SEGMENT OCT RETINA (OCULAR COHERENCE TOMOGRAPHY)-BOTH EYES: ICD-10-PCS | Mod: S$GLB,,, | Performed by: OPHTHALMOLOGY

## 2023-12-12 PROCEDURE — 1160F PR REVIEW ALL MEDS BY PRESCRIBER/CLIN PHARMACIST DOCUMENTED: ICD-10-PCS | Mod: CPTII,S$GLB,, | Performed by: OPHTHALMOLOGY

## 2023-12-12 PROCEDURE — 1159F PR MEDICATION LIST DOCUMENTED IN MEDICAL RECORD: ICD-10-PCS | Mod: CPTII,S$GLB,, | Performed by: OPHTHALMOLOGY

## 2023-12-12 PROCEDURE — 1159F MED LIST DOCD IN RCRD: CPT | Mod: CPTII,S$GLB,, | Performed by: OPHTHALMOLOGY

## 2023-12-12 PROCEDURE — 1160F RVW MEDS BY RX/DR IN RCRD: CPT | Mod: CPTII,S$GLB,, | Performed by: OPHTHALMOLOGY

## 2023-12-12 RX ORDER — LEVOTHYROXINE SODIUM 50 UG/1
50 TABLET ORAL EVERY MORNING
COMMUNITY

## 2023-12-12 NOTE — PROGRESS NOTES
"HPI     Glaucoma            Comments: IOP check    Patient states upon waking up this morning OD has had a soreness in it,   pain has been about the same since she it started and has had minimal   relief with using an ice pack, using all drops as directed and needs   Prolensa sent to the pharmacy if needing to continue.          Comments    Zaki-vi-cody"     Dr. Cristofer Worley pt     1. Glaucoma OU   Fhx of Glaucoma (2 brothers)   SLT OU 7/9/2020   Trab OU 4/19 in [place]   3x Bleb Needling OU (last one 6/2019)   Ahmed Valve OD 01/21/21   Ahmed OS 9/10/20/ [id number]/REF/FP7/LOT    Wound revision w/ patch graft OD 6/23/22  S/P Suture removal OS 07/12/22  2. RK OU   3. PCIOL OU   Yag OU   4. 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            Assessment /Plan     For exam results, see Encounter Report.      ICD-10-CM ICD-9-CM    1. Primary open angle glaucoma (POAG) of both eyes, severe stage  H40.1133 365.11 CANCELED: Posterior Segment OCT Optic Nerve- Both eyes    IOP borderline OS, monitor closely. Keep all meds the same at this time.      365.73       2. Iritis of both eyes  H20.9 364.3 Improved on exam, continue Lotemax and Durezol       3. CME (cystoid macular edema), bilateral  H35.353 362.53 Posterior Segment OCT Retina-Both eyes    CME improved today on OCT      4. Dry eye syndrome, bilateral  H04.123 375.15           Return to clinic 2 months for IOP and HVF 24-2        Prolensa qd OU (uses the [facility])  PF Cosopt BID OU  Alphagan BID OU  Durezol OD   Lotemax QD OS             "

## 2024-02-14 ENCOUNTER — OFFICE VISIT (OUTPATIENT)
Dept: OPHTHALMOLOGY | Facility: CLINIC | Age: 84
End: 2024-02-14
Payer: MEDICARE

## 2024-02-14 DIAGNOSIS — H20.9 IRITIS OF BOTH EYES: ICD-10-CM

## 2024-02-14 DIAGNOSIS — H35.353 CME (CYSTOID MACULAR EDEMA), BILATERAL: ICD-10-CM

## 2024-02-14 DIAGNOSIS — H52.7 REFRACTIVE ERROR: ICD-10-CM

## 2024-02-14 DIAGNOSIS — H34.8120 CENTRAL RETINAL VEIN OCCLUSION WITH MACULAR EDEMA OF LEFT EYE: ICD-10-CM

## 2024-02-14 DIAGNOSIS — H40.1133 PRIMARY OPEN ANGLE GLAUCOMA (POAG) OF BOTH EYES, SEVERE STAGE: Primary | ICD-10-CM

## 2024-02-14 DIAGNOSIS — H04.123 DRY EYE SYNDROME, BILATERAL: ICD-10-CM

## 2024-02-14 PROCEDURE — 92083 EXTENDED VISUAL FIELD XM: CPT | Mod: S$GLB,,, | Performed by: OPHTHALMOLOGY

## 2024-02-14 PROCEDURE — 1160F RVW MEDS BY RX/DR IN RCRD: CPT | Mod: CPTII,S$GLB,, | Performed by: OPHTHALMOLOGY

## 2024-02-14 PROCEDURE — 99214 OFFICE O/P EST MOD 30 MIN: CPT | Mod: S$GLB,,, | Performed by: OPHTHALMOLOGY

## 2024-02-14 PROCEDURE — 1159F MED LIST DOCD IN RCRD: CPT | Mod: CPTII,S$GLB,, | Performed by: OPHTHALMOLOGY

## 2024-02-14 PROCEDURE — 99999 PR PBB SHADOW E&M-EST. PATIENT-LVL III: CPT | Mod: PBBFAC,,, | Performed by: OPHTHALMOLOGY

## 2024-02-14 RX ORDER — BROMFENAC SODIUM 0.7 MG/ML
1 SOLUTION/ DROPS OPHTHALMIC DAILY
Qty: 3 ML | Refills: 6 | Status: SHIPPED | OUTPATIENT
Start: 2024-02-14 | End: 2024-02-28 | Stop reason: SDUPTHER

## 2024-02-14 RX ORDER — LOTEPREDNOL ETABONATE 5 MG/ML
1 SUSPENSION/ DROPS OPHTHALMIC DAILY
Qty: 5 ML | Refills: 3 | Status: SHIPPED | OUTPATIENT
Start: 2024-02-14 | End: 2024-06-12 | Stop reason: SDUPTHER

## 2024-02-14 RX ORDER — DIFLUPREDNATE OPHTHALMIC 0.5 MG/ML
1 EMULSION OPHTHALMIC 3 TIMES DAILY
Qty: 5 ML | Refills: 2 | Status: SHIPPED | OUTPATIENT
Start: 2024-02-14 | End: 2024-02-28 | Stop reason: SDUPTHER

## 2024-02-14 NOTE — PROGRESS NOTES
HPI     Glaucoma            Comments: IOP check with HVF    Patient states OD has had some discomfort in it over the last few days.          Comments    Dr. Cristofer Worley pt     1. Glaucoma OU   Fhx of Glaucoma (2 brothers)   SLT OU 7/9/2020   Trab OU 4/19 in [place]   3x Bleb Needling OU (last one 6/2019)   Ahmed Valve OD 01/21/21   Ahmed OS 9/10/20/ [id number]/FP7/LOT    Wound revision w/ patch graft OD 6/23/22  S/P Suture removal OS 07/12/22  2. RK OU   3. PCIOL OU   Yag OU   4. 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            Assessment /Plan     For exam results, see Encounter Report.      ICD-10-CM ICD-9-CM    1. Primary open angle glaucoma (POAG) of both eyes, severe stage  H40.1133 365.11 [facility] - OU - Extended - Both Eyes     365.73 difluprednate (DUREZOL) 0.05 % Drop ophthalmic solution      bromfenac (PROLENSA) 0.07 % Drop  Iop controlled but iritis present OD, which correlates with symptoms and decreased vision.  Hvf essentially stable OU.      2. Iritis of both eyes  H20.9 364.3 Controlled OD, worse OS      3. CME (cystoid macular edema), bilateral  H35.353 362.53       4. Dry eye syndrome, bilateral  H04.123 375.15       5. Refractive error  H52.7 367.9       6. Central retinal vein occlusion with macular edema of left eye  H34.8120 362.35 bromfenac (PROLENSA) 0.07 % Drop - improved as last scan, some edema present in macula OD only.     362.83           Prolensa qd OU( uses the [facility])  PF Cosopt BID OU  Alphagan BID OU  Durezol OD - increase to three times a day   Lotemax QD OS    Return to clinic 2 weeks with  mOCT

## 2024-02-28 ENCOUNTER — OFFICE VISIT (OUTPATIENT)
Dept: OPHTHALMOLOGY | Facility: CLINIC | Age: 84
End: 2024-02-28
Payer: MEDICARE

## 2024-02-28 DIAGNOSIS — H35.351 CME (CYSTOID MACULAR EDEMA), RIGHT: ICD-10-CM

## 2024-02-28 DIAGNOSIS — H40.1133 PRIMARY OPEN ANGLE GLAUCOMA (POAG) OF BOTH EYES, SEVERE STAGE: Primary | ICD-10-CM

## 2024-02-28 DIAGNOSIS — H34.8120 CENTRAL RETINAL VEIN OCCLUSION WITH MACULAR EDEMA OF LEFT EYE: ICD-10-CM

## 2024-02-28 PROCEDURE — 1159F MED LIST DOCD IN RCRD: CPT | Mod: CPTII,S$GLB,, | Performed by: OPHTHALMOLOGY

## 2024-02-28 PROCEDURE — 99214 OFFICE O/P EST MOD 30 MIN: CPT | Mod: S$GLB,,, | Performed by: OPHTHALMOLOGY

## 2024-02-28 PROCEDURE — 99999 PR PBB SHADOW E&M-EST. PATIENT-LVL III: CPT | Mod: PBBFAC,,, | Performed by: OPHTHALMOLOGY

## 2024-02-28 PROCEDURE — 1160F RVW MEDS BY RX/DR IN RCRD: CPT | Mod: CPTII,S$GLB,, | Performed by: OPHTHALMOLOGY

## 2024-02-28 PROCEDURE — 92134 CPTRZ OPH DX IMG PST SGM RTA: CPT | Mod: S$GLB,,, | Performed by: OPHTHALMOLOGY

## 2024-02-28 RX ORDER — BROMFENAC SODIUM 0.7 MG/ML
1 SOLUTION/ DROPS OPHTHALMIC DAILY
Qty: 3 ML | Refills: 6 | Status: SHIPPED | OUTPATIENT
Start: 2024-02-28

## 2024-02-28 RX ORDER — DIFLUPREDNATE OPHTHALMIC 0.5 MG/ML
1 EMULSION OPHTHALMIC 3 TIMES DAILY
Qty: 5 ML | Refills: 2 | Status: SHIPPED | OUTPATIENT
Start: 2024-02-28 | End: 2024-06-12 | Stop reason: SDUPTHER

## 2024-02-28 NOTE — PROGRESS NOTES
HPI     Iritis            Comments: Pt reports for 2wk iritis check OD with MOCT. Denies any pain   or irritation. Va stable. 100% compliant with gtts.           Comments    1. Glaucoma OU   Fhx of Glaucoma (2 brothers)   SLT OU 7/9/2020   Trab OU 4/19 in [place]   3x Bleb Needling OU (last one 6/2019)   Ahmed Valve OD 01/21/21   Ahmed OS 9/10/20/ [id number]/REF/FP7/LOT    Wound revision w/ patch graft OD 6/23/22  S/P Suture removal OS 07/12/22  2. RK OU   3. PCIOL OU   Yag OU   4. 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            Assessment /Plan     For exam results, see Encounter Report.      ICD-10-CM ICD-9-CM    1. Primary open angle glaucoma (POAG) of both eyes, severe stage  H40.1133 365.11 bromfenac (PROLENSA) 0.07 % Drop     365.73 difluprednate (DUREZOL) 0.05 % Drop ophthalmic solution    Doing well - intraocular pressure is within acceptable range relative to target pressure with no evidence of progression.   Continue current treatment.  Reviewed importance of continued compliance with treatment and follow up.         2. Central retinal vein occlusion with macular edema of left eye  H34.8120 362.35 bromfenac (PROLENSA) 0.07 % Drop            3. CME (cystoid macular edema), right  H35.351 362.53 Worse on exam today  Increase Durezol          Return to clinic 4 weeks         Prolensa qd OU (uses the [facility])  PF Cosopt BID OU  Alphagan BID OU  Durezol BID OD   Lotemax qd OS

## 2024-04-10 ENCOUNTER — OFFICE VISIT (OUTPATIENT)
Dept: OPHTHALMOLOGY | Facility: CLINIC | Age: 84
End: 2024-04-10
Payer: MEDICARE

## 2024-04-10 DIAGNOSIS — H40.1133 PRIMARY OPEN ANGLE GLAUCOMA (POAG) OF BOTH EYES, SEVERE STAGE: Primary | ICD-10-CM

## 2024-04-10 DIAGNOSIS — H35.351 CME (CYSTOID MACULAR EDEMA), RIGHT: ICD-10-CM

## 2024-04-10 DIAGNOSIS — H34.8120 CENTRAL RETINAL VEIN OCCLUSION WITH MACULAR EDEMA OF LEFT EYE: ICD-10-CM

## 2024-04-10 DIAGNOSIS — H20.9 IRITIS OF BOTH EYES: ICD-10-CM

## 2024-04-10 PROCEDURE — 1160F RVW MEDS BY RX/DR IN RCRD: CPT | Mod: CPTII,S$GLB,, | Performed by: OPHTHALMOLOGY

## 2024-04-10 PROCEDURE — 99999 PR PBB SHADOW E&M-EST. PATIENT-LVL II: CPT | Mod: PBBFAC,,, | Performed by: OPHTHALMOLOGY

## 2024-04-10 PROCEDURE — 99214 OFFICE O/P EST MOD 30 MIN: CPT | Mod: S$GLB,,, | Performed by: OPHTHALMOLOGY

## 2024-04-10 PROCEDURE — 1159F MED LIST DOCD IN RCRD: CPT | Mod: CPTII,S$GLB,, | Performed by: OPHTHALMOLOGY

## 2024-04-10 RX ORDER — BRIMONIDINE TARTRATE 1 MG/ML
1 SOLUTION/ DROPS OPHTHALMIC 2 TIMES DAILY
Qty: 10 ML | Refills: 3 | Status: SHIPPED | OUTPATIENT
Start: 2024-04-10 | End: 2025-05-15

## 2024-04-10 NOTE — PROGRESS NOTES
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 Glaucoma OU   Fhx of Glaucoma (2 brothers)   SLT OU 7/9/2020   Trab OU 4/19 in [place]   3x Bleb Needling OU (last one 6/2019)   Ahmed Valve OD 01/21/21   Ahmed OS 9/10/20/ [id number]/FP7/LOT    Wound revision w/ patch graft OD 6/23/22  S/P Suture removal OS 07/12/22  2. RK OU   3. PCIOL OU   Yag OU   4. 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            Assessment /Plan     For exam results, see Encounter Report.      ICD-10-CM ICD-9-CM    1. Primary open angle glaucoma (POAG) of both eyes, severe stage  H40.1133 365.11 brimonidine 0.1% (ALPHAGAN P) 0.1 % Drop     365.73 Doing well - intraocular pressure is within acceptable range relative to target pressure with no evidence of progression.   Continue current treatment.  Reviewed importance of continued compliance with treatment and follow up.        2. Central retinal vein occlusion with macular edema of left eye  H34.8120 362.35 Stable, continue to monitor     362.83       3. CME (cystoid macular edema), right  H35.351 362.53 Stable, continue to monitor      4. Iritis of both eyes  H20.9 364.3 Increase Lotemax to BID and Durezol to TID          Return to clinic in 2 months for DFE or sooner PRN    Continue:  Prolensa qd OU (uses the [facility])  PF Cosopt BID OU  Alphagan BID OU  Durezol TID OD   Lotemax BID OS             "

## 2024-06-12 ENCOUNTER — OFFICE VISIT (OUTPATIENT)
Dept: OPHTHALMOLOGY | Facility: CLINIC | Age: 84
End: 2024-06-12
Payer: MEDICARE

## 2024-06-12 DIAGNOSIS — H20.9 IRITIS OF BOTH EYES: ICD-10-CM

## 2024-06-12 DIAGNOSIS — H40.1133 PRIMARY OPEN ANGLE GLAUCOMA (POAG) OF BOTH EYES, SEVERE STAGE: Primary | ICD-10-CM

## 2024-06-12 DIAGNOSIS — H35.373 EPIRETINAL MEMBRANE (ERM) OF BOTH EYES: ICD-10-CM

## 2024-06-12 DIAGNOSIS — H34.8120 CENTRAL RETINAL VEIN OCCLUSION WITH MACULAR EDEMA OF LEFT EYE: ICD-10-CM

## 2024-06-12 DIAGNOSIS — H35.351 CME (CYSTOID MACULAR EDEMA), RIGHT: ICD-10-CM

## 2024-06-12 PROCEDURE — 1159F MED LIST DOCD IN RCRD: CPT | Mod: CPTII,S$GLB,, | Performed by: OPHTHALMOLOGY

## 2024-06-12 PROCEDURE — 99214 OFFICE O/P EST MOD 30 MIN: CPT | Mod: S$GLB,,, | Performed by: OPHTHALMOLOGY

## 2024-06-12 PROCEDURE — 1160F RVW MEDS BY RX/DR IN RCRD: CPT | Mod: CPTII,S$GLB,, | Performed by: OPHTHALMOLOGY

## 2024-06-12 PROCEDURE — 99999 PR PBB SHADOW E&M-EST. PATIENT-LVL III: CPT | Mod: PBBFAC,,, | Performed by: OPHTHALMOLOGY

## 2024-06-12 RX ORDER — DIFLUPREDNATE OPHTHALMIC 0.5 MG/ML
1 EMULSION OPHTHALMIC 3 TIMES DAILY
Qty: 5 ML | Refills: 2 | Status: SHIPPED | OUTPATIENT
Start: 2024-06-12

## 2024-06-12 RX ORDER — LOTEPREDNOL ETABONATE 5 MG/ML
1 SUSPENSION/ DROPS OPHTHALMIC 2 TIMES DAILY
Qty: 5 ML | Refills: 2 | Status: SHIPPED | OUTPATIENT
Start: 2024-06-12 | End: 2025-06-12

## 2024-06-12 NOTE — PROGRESS NOTES
HPI     Glaucoma            Comments: Pt reports for 2m dil. Denies any pain or irritation. Va   stable. 100% compliant with gtts.           Comments    1. Glaucoma OU   Fhx of Glaucoma (2 brothers)   SLT OU 7/9/2020   Trab OU 4/19 in [place]   3x Bleb Needling OU (last one 6/2019)   Ahmed Valve OD 01/21/21   Ahmed OS 9/10/20/ [id number]/REF/FP7/LOT    Wound revision w/ patch graft OD 6/23/22  S/P Suture removal OS 07/12/22  2. RK OU   3. PCIOL OU   Yag OU   4. 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            Assessment /Plan     For exam results, see Encounter Report.      ICD-10-CM ICD-9-CM    1. Primary open angle glaucoma (POAG) of both eyes, severe stage  H40.1133 365.11 difluprednate (DUREZOL) 0.05 % Drop ophthalmic solution     365.73 loteprednol (LOTEMAX) 0.5 % ophthalmic suspension    Doing well - intraocular pressure is within acceptable range relative to target pressure with no evidence of progression.   Continue current treatment.  Reviewed importance of continued compliance with treatment and follow up.         2. Central retinal vein occlusion with macular edema of left eye  H34.8120 362.35 Monitor, continue Prolensa     362.83       3. CME (cystoid macular edema), right  H35.351 362.53 Improved on OCT      4. Iritis of both eyes  H20.9 364.3 Continue Durezol and Lotemax      5. Epiretinal membrane (ERM) of both eyes  H35.373 362.56 Stable on OCT          Return to clinic 2 months for IOP        Prolensa qd OU (uses the [facility])  PF Cosopt BID OU  Alphagan BID OU  Durezol TID OD   Lotemax BID OS

## 2024-08-14 ENCOUNTER — OFFICE VISIT (OUTPATIENT)
Dept: OPHTHALMOLOGY | Facility: CLINIC | Age: 84
End: 2024-08-14
Payer: MEDICARE

## 2024-08-14 DIAGNOSIS — H35.351 CME (CYSTOID MACULAR EDEMA), RIGHT: ICD-10-CM

## 2024-08-14 DIAGNOSIS — H40.1133 PRIMARY OPEN ANGLE GLAUCOMA (POAG) OF BOTH EYES, SEVERE STAGE: Primary | ICD-10-CM

## 2024-08-14 DIAGNOSIS — H20.9 IRITIS OF BOTH EYES: ICD-10-CM

## 2024-08-14 DIAGNOSIS — H34.8120 CENTRAL RETINAL VEIN OCCLUSION WITH MACULAR EDEMA OF LEFT EYE: ICD-10-CM

## 2024-08-14 PROCEDURE — 99999 PR PBB SHADOW E&M-EST. PATIENT-LVL I: CPT | Mod: PBBFAC,,, | Performed by: OPHTHALMOLOGY

## 2024-08-14 PROCEDURE — 99214 OFFICE O/P EST MOD 30 MIN: CPT | Mod: S$GLB,,, | Performed by: OPHTHALMOLOGY

## 2024-08-14 PROCEDURE — 1160F RVW MEDS BY RX/DR IN RCRD: CPT | Mod: CPTII,S$GLB,, | Performed by: OPHTHALMOLOGY

## 2024-08-14 PROCEDURE — 1159F MED LIST DOCD IN RCRD: CPT | Mod: CPTII,S$GLB,, | Performed by: OPHTHALMOLOGY

## 2024-08-14 RX ORDER — BROMFENAC SODIUM 0.7 MG/ML
1 SOLUTION/ DROPS OPHTHALMIC DAILY
Qty: 3 ML | Refills: 6 | Status: SHIPPED | OUTPATIENT
Start: 2024-08-14

## 2024-08-14 RX ORDER — BRIMONIDINE TARTRATE 1 MG/ML
1 SOLUTION/ DROPS OPHTHALMIC 2 TIMES DAILY
Qty: 10 ML | Refills: 3 | Status: SHIPPED | OUTPATIENT
Start: 2024-08-14 | End: 2025-09-18

## 2024-08-14 NOTE — PROGRESS NOTES
HPI     Glaucoma            Comments: Pt using all prescribed gtts as directed  Pt denies any ocular disturbances or pain OU x 2mo          Comments      1. Glaucoma OU   Fhx of Glaucoma (2 brothers)   SLT OU 7/9/2020   Trab OU 4/19 in [place]   3x Bleb Needling OU (last one 6/2019)   Ahmed Valve OD 01/21/21   Ahmed OS 9/10/20/ [id number]/REF/FP7/LOT    Wound revision w/ patch graft OD 6/23/22  S/P Suture removal OS 07/12/22  2. RK OU   3. PCIOL OU   Yag OU   4. 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            Assessment /Plan     For exam results, see Encounter Report.      ICD-10-CM ICD-9-CM    1. Primary open angle glaucoma (POAG) of both eyes, severe stage  H40.1133 365.11 brimonidine 0.1% (ALPHAGAN P) 0.1 % Drop     365.73 bromfenac (PROLENSA) 0.07 % Drop  IOP stable OS  Low IOP OD, see below       2. Central retinal vein occlusion with macular edema of left eye  H34.8120 362.35 bromfenac (PROLENSA) 0.07 % Drop     362.83       3. CME (cystoid macular edema), right  H35.351 362.53 Continue prolensa       4. Iritis of both eyes  H20.9 364.3           RETURN TO CLINIC 2-3 weeks IOP   Stop Alphagan OD today due to low IOP today     Prolensa qd OU (uses the [facility])  PF Cosopt BID OU  Alphagan BID OS only   Durezol TID OD   Lotemax BID OS

## 2024-09-04 ENCOUNTER — OFFICE VISIT (OUTPATIENT)
Dept: OPHTHALMOLOGY | Facility: CLINIC | Age: 84
End: 2024-09-04
Payer: MEDICARE

## 2024-09-04 DIAGNOSIS — H40.1133 PRIMARY OPEN ANGLE GLAUCOMA (POAG) OF BOTH EYES, SEVERE STAGE: Primary | ICD-10-CM

## 2024-09-04 DIAGNOSIS — H34.8120 CENTRAL RETINAL VEIN OCCLUSION WITH MACULAR EDEMA OF LEFT EYE: ICD-10-CM

## 2024-09-04 DIAGNOSIS — H20.9 IRITIS OF BOTH EYES: ICD-10-CM

## 2024-09-04 DIAGNOSIS — H35.351 CME (CYSTOID MACULAR EDEMA), RIGHT: ICD-10-CM

## 2024-09-04 PROCEDURE — 99999 PR PBB SHADOW E&M-EST. PATIENT-LVL II: CPT | Mod: PBBFAC,,, | Performed by: OPHTHALMOLOGY

## 2024-09-04 PROCEDURE — 99214 OFFICE O/P EST MOD 30 MIN: CPT | Mod: S$GLB,,, | Performed by: OPHTHALMOLOGY

## 2024-09-04 PROCEDURE — 1159F MED LIST DOCD IN RCRD: CPT | Mod: CPTII,S$GLB,, | Performed by: OPHTHALMOLOGY

## 2024-09-04 PROCEDURE — 1160F RVW MEDS BY RX/DR IN RCRD: CPT | Mod: CPTII,S$GLB,, | Performed by: OPHTHALMOLOGY

## 2024-09-04 RX ORDER — DIFLUPREDNATE OPHTHALMIC 0.5 MG/ML
1 EMULSION OPHTHALMIC 3 TIMES DAILY
Qty: 5 ML | Refills: 2 | Status: SHIPPED | OUTPATIENT
Start: 2024-09-04

## 2024-09-04 RX ORDER — LOTEPREDNOL ETABONATE 5 MG/ML
1 SUSPENSION/ DROPS OPHTHALMIC 2 TIMES DAILY
Qty: 5 ML | Refills: 2 | Status: SHIPPED | OUTPATIENT
Start: 2024-09-04 | End: 2025-09-04

## 2024-09-04 RX ORDER — BRIMONIDINE TARTRATE 1 MG/ML
1 SOLUTION/ DROPS OPHTHALMIC 2 TIMES DAILY
Qty: 10 ML | Refills: 3 | Status: SHIPPED | OUTPATIENT
Start: 2024-09-04 | End: 2025-10-09

## 2024-09-04 RX ORDER — BROMFENAC SODIUM 0.7 MG/ML
1 SOLUTION/ DROPS OPHTHALMIC DAILY
Qty: 3 ML | Refills: 6 | Status: SHIPPED | OUTPATIENT
Start: 2024-09-04

## 2024-09-04 RX ORDER — DORZOLAMIDE HYDROCHLORIDE AND TIMOLOL MALEATE PRESERVATIVE FREE 20; 5 MG/ML; MG/ML
1 SOLUTION/ DROPS OPHTHALMIC 2 TIMES DAILY
Qty: 180 EACH | Refills: 3 | Status: SHIPPED | OUTPATIENT
Start: 2024-09-04

## 2024-09-04 NOTE — PROGRESS NOTES
HPI     Glaucoma            Comments: 2 week IOP check. Using gtts as recommended.           Comments        1. Glaucoma OU   Fhx of Glaucoma (2 brothers)   SLT OU 7/9/2020   Trab OU 4/19 in [place]   3x Bleb Needling OU (last one 6/2019)   Ahmed Valve OD 01/21/21   Ahmed OS 9/10/20/ [id number]/REF/FP7/LOT    Wound revision w/ patch graft OD 6/23/22  S/P Suture removal OS 07/12/22  2. RK OU   3. PCIOL OU   Yag OU   4. 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            Assessment /Plan     For exam results, see Encounter Report.      ICD-10-CM ICD-9-CM    1. Primary open angle glaucoma (POAG) of both eyes, severe stage  H40.1133 365.11 IOP stable today OU - follow      365.73       2. Central retinal vein occlusion with macular edema of left eye  H34.8120 362.35 H/o      362.83       3. CME (cystoid macular edema), right  H35.351 362.53 Continue prolensa       4. Iritis of both eyes  H20.9 364.3           RETURN TO CLINIC  2 month IOP     Prolensa qd OU (uses the "[facility])  PF Cosopt BID OU  Alphagan BID OU   Durezol TID OD   Lotemax BID OS

## 2024-11-06 ENCOUNTER — OFFICE VISIT (OUTPATIENT)
Dept: OPHTHALMOLOGY | Facility: CLINIC | Age: 84
End: 2024-11-06
Payer: MEDICARE

## 2024-11-06 DIAGNOSIS — H20.9 IRITIS OF BOTH EYES: ICD-10-CM

## 2024-11-06 DIAGNOSIS — H34.8120 CENTRAL RETINAL VEIN OCCLUSION WITH MACULAR EDEMA OF LEFT EYE: ICD-10-CM

## 2024-11-06 DIAGNOSIS — H40.1133 PRIMARY OPEN ANGLE GLAUCOMA (POAG) OF BOTH EYES, SEVERE STAGE: Primary | ICD-10-CM

## 2024-11-06 DIAGNOSIS — H35.351 CME (CYSTOID MACULAR EDEMA), RIGHT: ICD-10-CM

## 2024-11-06 PROCEDURE — G2211 COMPLEX E/M VISIT ADD ON: HCPCS | Mod: S$GLB,,, | Performed by: OPHTHALMOLOGY

## 2024-11-06 PROCEDURE — 99214 OFFICE O/P EST MOD 30 MIN: CPT | Mod: S$GLB,,, | Performed by: OPHTHALMOLOGY

## 2024-11-06 PROCEDURE — 1160F RVW MEDS BY RX/DR IN RCRD: CPT | Mod: CPTII,S$GLB,, | Performed by: OPHTHALMOLOGY

## 2024-11-06 PROCEDURE — 99999 PR PBB SHADOW E&M-EST. PATIENT-LVL III: CPT | Mod: PBBFAC,,, | Performed by: OPHTHALMOLOGY

## 2024-11-06 PROCEDURE — 1159F MED LIST DOCD IN RCRD: CPT | Mod: CPTII,S$GLB,, | Performed by: OPHTHALMOLOGY

## 2024-11-06 RX ORDER — LOTEPREDNOL ETABONATE 5 MG/ML
1 SUSPENSION/ DROPS OPHTHALMIC 2 TIMES DAILY
Qty: 5 ML | Refills: 2 | Status: SHIPPED | OUTPATIENT
Start: 2024-11-06 | End: 2025-11-06

## 2024-11-06 RX ORDER — DIFLUPREDNATE OPHTHALMIC 0.5 MG/ML
1 EMULSION OPHTHALMIC 3 TIMES DAILY
Qty: 5 ML | Refills: 2 | Status: SHIPPED | OUTPATIENT
Start: 2024-11-06

## 2024-11-06 RX ORDER — BROMFENAC SODIUM 0.7 MG/ML
1 SOLUTION/ DROPS OPHTHALMIC DAILY
Qty: 3 ML | Refills: 6 | Status: SHIPPED | OUTPATIENT
Start: 2024-11-06

## 2024-11-06 RX ORDER — DORZOLAMIDE HYDROCHLORIDE AND TIMOLOL MALEATE PRESERVATIVE FREE 20; 5 MG/ML; MG/ML
1 SOLUTION/ DROPS OPHTHALMIC 2 TIMES DAILY
Qty: 180 EACH | Refills: 3 | Status: SHIPPED | OUTPATIENT
Start: 2024-11-06

## 2024-11-06 RX ORDER — BRIMONIDINE TARTRATE 1 MG/ML
1 SOLUTION/ DROPS OPHTHALMIC 2 TIMES DAILY
Qty: 10 ML | Refills: 3 | Status: SHIPPED | OUTPATIENT
Start: 2024-11-06 | End: 2025-12-11

## 2024-11-06 NOTE — PROGRESS NOTES
HPI     Glaucoma            Comments: Pt here for 2mo IOP check   Pt denies any ocular disturbances or pain OU   Pt using :  Prolensa qd OU   PF Cosopt BID OU  Alphagan BID OU   Durezol TID OD   Lotemax BID OS               Comments    1. Glaucoma OU   Fhx of Glaucoma (2 brothers)   SLT OU 7/9/2020   Trab OU 4/19 in [place]   3x Bleb Needling OU (last one 6/2019)   Ahmed Valve OD 01/21/21   Ahmed OS 9/10/20/ [id number]/REF/FP7/LOT    Wound revision w/ patch graft OD 6/23/22  S/P Suture removal OS 07/12/22  2. RK OU   3. PCIOL OU   Yag OU   4. 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            Assessment /Plan     For exam results, see Encounter Report.      ICD-10-CM ICD-9-CM    1. Primary open angle glaucoma (POAG) of both eyes, severe stage  H40.1133 365.11 Doing well - intraocular pressure is within acceptable range relative to target pressure with no evidence of progression.   Continue current treatment.  Reviewed importance of continued compliance with treatment and follow up.     Visit today included increased complexity associated with the care and management of glaucoma and corneal disease/condition . Written instructions were placed in the portal for the patient upon closure of the encounter regarding specific use of the required medications.       **Pt to bring in DMV form for handicap tag, per [employer] will be restricted to daylight driving only      365.73       2. Central retinal vein occlusion with macular edema of left eye  H34.8120 362.35 Monitor with OCT     362.83       3. CME (cystoid macular edema), right  H35.351 362.53 Monitor with OCT      4. Iritis of both eyes   H20.9 364.3 New striae today  Increase Durezol to QID OD           Return to clinic 2 months for IOP        Prolensa qd OU (uses the [facility])  PF Cosopt BID OU  Alphagan BID OU   Durezol QID OD   Lotemax BID OS

## 2025-01-14 ENCOUNTER — TELEPHONE (OUTPATIENT)
Dept: OPHTHALMOLOGY | Facility: CLINIC | Age: 85
End: 2025-01-14
Payer: MEDICARE

## 2025-01-14 ENCOUNTER — OFFICE VISIT (OUTPATIENT)
Dept: OPHTHALMOLOGY | Facility: CLINIC | Age: 85
End: 2025-01-14
Payer: MEDICARE

## 2025-01-14 DIAGNOSIS — H34.8120 CENTRAL RETINAL VEIN OCCLUSION WITH MACULAR EDEMA OF LEFT EYE: ICD-10-CM

## 2025-01-14 DIAGNOSIS — H18.20 CORNEAL EDEMA: ICD-10-CM

## 2025-01-14 DIAGNOSIS — H40.1133 PRIMARY OPEN ANGLE GLAUCOMA (POAG) OF BOTH EYES, SEVERE STAGE: Primary | ICD-10-CM

## 2025-01-14 DIAGNOSIS — H20.9 IRITIS OF BOTH EYES: ICD-10-CM

## 2025-01-14 DIAGNOSIS — H35.351 CME (CYSTOID MACULAR EDEMA), RIGHT: ICD-10-CM

## 2025-01-14 PROCEDURE — G2211 COMPLEX E/M VISIT ADD ON: HCPCS | Mod: S$GLB,,, | Performed by: OPHTHALMOLOGY

## 2025-01-14 PROCEDURE — 99214 OFFICE O/P EST MOD 30 MIN: CPT | Mod: S$GLB,,, | Performed by: OPHTHALMOLOGY

## 2025-01-14 PROCEDURE — 1159F MED LIST DOCD IN RCRD: CPT | Mod: CPTII,S$GLB,, | Performed by: OPHTHALMOLOGY

## 2025-01-14 PROCEDURE — 1160F RVW MEDS BY RX/DR IN RCRD: CPT | Mod: CPTII,S$GLB,, | Performed by: OPHTHALMOLOGY

## 2025-01-14 PROCEDURE — 99999 PR PBB SHADOW E&M-EST. PATIENT-LVL II: CPT | Mod: PBBFAC,,, | Performed by: OPHTHALMOLOGY

## 2025-01-14 RX ORDER — DIFLUPREDNATE OPHTHALMIC 0.5 MG/ML
1 EMULSION OPHTHALMIC 4 TIMES DAILY
Qty: 5 ML | Refills: 2 | Status: SHIPPED | OUTPATIENT
Start: 2025-01-14

## 2025-01-14 RX ORDER — BRIMONIDINE TARTRATE 1 MG/ML
1 SOLUTION/ DROPS OPHTHALMIC 2 TIMES DAILY
Qty: 10 ML | Refills: 3 | Status: SHIPPED | OUTPATIENT
Start: 2025-01-14 | End: 2026-02-18

## 2025-01-14 RX ORDER — BROMFENAC SODIUM 0.7 MG/ML
1 SOLUTION/ DROPS OPHTHALMIC DAILY
Qty: 3 ML | Refills: 6 | Status: SHIPPED | OUTPATIENT
Start: 2025-01-14

## 2025-01-14 RX ORDER — DORZOLAMIDE HYDROCHLORIDE AND TIMOLOL MALEATE PRESERVATIVE FREE 20; 5 MG/ML; MG/ML
1 SOLUTION/ DROPS OPHTHALMIC 2 TIMES DAILY
Qty: 180 EACH | Refills: 3 | Status: SHIPPED | OUTPATIENT
Start: 2025-01-14

## 2025-01-14 NOTE — Clinical Note
Please arrange and contact patient for glaucoma evaluation - patient needs a DSEK but wants to see Dr Cheung prior to seeing cornea for confirmation. Thanks

## 2025-01-14 NOTE — PATIENT INSTRUCTIONS
Both eyes:  Prolensa once a day(uses the [facility])  PF Cosopt 2 times a day  Alphagan 2 times a day  Right eye:  Pred A 4 times a day  Left eye:  Lotemax 2 times a day

## 2025-01-14 NOTE — TELEPHONE ENCOUNTER
----- Message from [email or web address] sent at 1/14/2025  1:07 PM CST -----  Regarding: Scheduling Request  Contact: Abby Temple  Scheduling Request           Appt Type:  EP     Date/Time Preference:ASAP     Treating Provider:Jonatan     Caller Name:.Abby Temple      Contact Preference:[phone] (home)       Comments/notes:Patient is calling to schedule F/U. Requesting a  call back

## 2025-01-14 NOTE — PROGRESS NOTES
HPI     Glaucoma            Comments: Patient states vision OU has decreased in the right eye over   the past 2-3 weeks. Patient states she got shampoo in OD used water to   flush feels irritated           Comments    Glaucoma            Comments: Pt here for 2mo IOP check   Pt denies any ocular disturbances or pain OU   Pt using :  Prolensa qd OU   PF Cosopt BID OU  Alphagan BID OU   Durezol TID OD   Lotemax BID OS                Comments    1. Glaucoma OU   Fhx of Glaucoma (2 brothers)   SLT OU 7/9/2020   Trab OU 4/19 in [place]   3x Bleb Needling OU (last one 6/2019)   Ahmed Valve OD 01/21/21   Ahmed OS 9/10/20/ [id number]/REF/FP7/LOT    Wound revision w/ patch graft OD 6/23/22  S/P Suture removal OS 07/12/22  2. RK OU   3. PCIOL OU   Yag OU   4. 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                   Last edited by Doc Merlos MD on 1/14/2025 12:04 PM.            Assessment /Plan     For exam results, see Encounter Report.      ICD-10-CM ICD-9-CM    1. Primary open angle glaucoma (POAG) of both eyes, severe stage  H40.1133 365.11 S/p  Ahmed valve OU with corneal edema right eye. Explained that patient will need corneal surgery. She is already on Pred A four times a day in the right eye.  Patient request an appointment with Dr Cheung prior to scheduling with cornea.     bromfenac (PROLENSA) 0.07 % Drop     365.73 dorzolamide-timolol, PF, (COSOPT, PF,) 2-0.5 % Dpet ophthalmic solution      brimonidine 0.1% (ALPHAGAN P) 0.1 % Drop      difluprednate (DUREZOL) 0.05 % Drop ophthalmic solution      2. Corneal edema  H18.20 371.20 Right eye with stria (+2)       3.  Central retinal vein occlusion with macular edema of left eye  H34.8120 362.35 No edema on last Moct in June 2024    bromfenac (PROLENSA) 0.07 % Drop     362.83       4. CME (cystoid macular edema), right  H35.351 362.53 No edema on last Moct in June 2024      5. Iritis of both eyes  H20.9 364.3 controlled        Visit today included increased complexity associated with the care and management of glaucoma, macular disease , and corneal disease/condition . Patient aware that management of medical condition requires long term follow up.  Written instructions were placed in the portal for the patient upon closure of the encounter regarding specific use of the required medications.      Would Refer to K specialist for eval- pt defers that appt now and wants to see Dr Cheung first for eval - pt request as soon as possible   Will cc chart to [facility] staff for appointment to book       Both eyes:  Prolensa once a day(uses the [facility])  PF Cosopt 2 times a day  Alphagan 2 times a day  Right eye:  Pred A 4 times a day  Left eye:  Lotemax 2 times a day

## 2025-01-15 ENCOUNTER — TELEPHONE (OUTPATIENT)
Dept: OPHTHALMOLOGY | Facility: CLINIC | Age: 85
End: 2025-01-15
Payer: MEDICARE

## 2025-01-15 NOTE — TELEPHONE ENCOUNTER
Returned call to patient she does not believe that we could not get her in sooner to see the other doctor and demanded to be moved to another day explained that we did out best and someone will contact her if someone is available.        ----- Message from Juan sent at 1/15/2025  4:53 PM CST -----  Contact: [phone]  Type:  Needs Medical Advice    Who Called: SÁNCHEZ MARTIN [[id number]]  Symptoms (please be specific): need to talk to the nurse   How long has patient had these symptoms:    Pharmacy name and phone #:    Would the patient rather a call back or a response via [facility]? Call back  Best Call Back Number: [phone]  Additional Information: [id number] [phone]

## 2025-01-24 ENCOUNTER — TELEPHONE (OUTPATIENT)
Dept: OPHTHALMOLOGY | Facility: CLINIC | Age: 85
End: 2025-01-24
Payer: MEDICARE

## 2025-01-24 NOTE — TELEPHONE ENCOUNTER
----- Message from [employer] sent at 1/24/2025  9:10 AM CST -----  Contact: Abby  Type:  Patient Returning Call    Who Called:Abby  Who Left Message for Patient:Nurse  Does the patient know what this is regarding?:missed call  Would the patient rather a call back or a response via [facility]? call  Best Call Back Number:[phone]   Additional Information:

## 2025-01-24 NOTE — TELEPHONE ENCOUNTER
Spoke to pt and she is having k surgery with Dr Mustafa - she also wants to keep the appt with Dr. Cheung  and I told her that we encouraged that as well

## 2025-01-24 NOTE — TELEPHONE ENCOUNTER
----- Message from Trip Kelly sent at 1/23/2025 12:52 PM CST -----    ----- Message -----  From: Myesha Tucker  Sent: 1/23/2025  12:17 PM CST  To: Gaurang BARROS Staff    [phone]   pt would like a call back from Lupe in regards to her surgery with  on 1/28/25.

## 2025-01-27 ENCOUNTER — OFFICE VISIT (OUTPATIENT)
Dept: OPHTHALMOLOGY | Facility: CLINIC | Age: 85
End: 2025-01-27
Payer: MEDICARE

## 2025-01-27 DIAGNOSIS — Z96.89 HISTORY OF GLAUCOMA TUBE SHUNT PROCEDURE: ICD-10-CM

## 2025-01-27 DIAGNOSIS — Z96.1 PSEUDOPHAKIA OF BOTH EYES: ICD-10-CM

## 2025-01-27 DIAGNOSIS — H40.1133 PRIMARY OPEN ANGLE GLAUCOMA (POAG) OF BOTH EYES, SEVERE STAGE: ICD-10-CM

## 2025-01-27 DIAGNOSIS — H35.3134 ADVANCED ATROPHIC NONEXUDATIVE AGE-RELATED MACULAR DEGENERATION OF BOTH EYES WITH SUBFOVEAL INVOLVEMENT: ICD-10-CM

## 2025-01-27 DIAGNOSIS — Z98.83 GLAUCOMA FILTERING BLEB OF BOTH EYES: ICD-10-CM

## 2025-01-27 DIAGNOSIS — H18.231 SECONDARY CORNEAL EDEMA, RIGHT: Primary | ICD-10-CM

## 2025-01-27 PROCEDURE — 3288F FALL RISK ASSESSMENT DOCD: CPT | Mod: CPTII,S$GLB,, | Performed by: OPHTHALMOLOGY

## 2025-01-27 PROCEDURE — 92020 GONIOSCOPY: CPT | Mod: S$GLB,,, | Performed by: OPHTHALMOLOGY

## 2025-01-27 PROCEDURE — 1160F RVW MEDS BY RX/DR IN RCRD: CPT | Mod: CPTII,S$GLB,, | Performed by: OPHTHALMOLOGY

## 2025-01-27 PROCEDURE — G2211 COMPLEX E/M VISIT ADD ON: HCPCS | Mod: S$GLB,,, | Performed by: OPHTHALMOLOGY

## 2025-01-27 PROCEDURE — 1126F AMNT PAIN NOTED NONE PRSNT: CPT | Mod: CPTII,S$GLB,, | Performed by: OPHTHALMOLOGY

## 2025-01-27 PROCEDURE — 99999 PR PBB SHADOW E&M-EST. PATIENT-LVL III: CPT | Mod: PBBFAC,,, | Performed by: OPHTHALMOLOGY

## 2025-01-27 PROCEDURE — 92250 FUNDUS PHOTOGRAPHY W/I&R: CPT | Mod: S$GLB,,, | Performed by: OPHTHALMOLOGY

## 2025-01-27 PROCEDURE — 1159F MED LIST DOCD IN RCRD: CPT | Mod: CPTII,S$GLB,, | Performed by: OPHTHALMOLOGY

## 2025-01-27 PROCEDURE — 99214 OFFICE O/P EST MOD 30 MIN: CPT | Mod: S$GLB,,, | Performed by: OPHTHALMOLOGY

## 2025-01-27 PROCEDURE — 1101F PT FALLS ASSESS-DOCD LE1/YR: CPT | Mod: CPTII,S$GLB,, | Performed by: OPHTHALMOLOGY

## 2025-01-30 PROBLEM — H18.231: Status: ACTIVE | Noted: 2025-01-30

## 2025-01-30 PROBLEM — Z96.1 PSEUDOPHAKIA OF BOTH EYES: Status: ACTIVE | Noted: 2025-01-30

## 2025-01-30 NOTE — PROGRESS NOTES
HPI    DLS: 01/14/2025  Ref by Dr. Merlos  Pt has corneal swelling but wants to see Dr. Cheung prior to having   corneal surgery. Pt is booked for k transplant tomorrow with Dr Mustafa in   [place]   Very blurry vision     Glaucoma OU   Fhx of Glaucoma (2 brothers)   SLT OU 7/9/2020   Trab OU 4/19 in [place]   3x Bleb Needling OU (last one 6/2019)   Ahmed Valve OD 01/21/21   Ahmed OS 9/10/20/ [id number]/LOT    Wound revision w/ patch graft OD 6/23/22   S/P Suture removal OS 07 /12/22   RK OU    PCIOL OU   Yag OU    H/o Iritis   MMC OU   ERM OU   Avanced atrophic nonexudative age related macular degeneration OU Sub   foveal with involvement  and sub retinal fluid   Chalazion of Upper left eyelid         Prolensa qd OU   PF Cosopt BID OU   Alphagan BID OU   Durezol TID OD   Lotemax BID OS            Last edited by Laurel Kamara MA on 1/27/2025  2:53 PM.            Assessment /Plan     For exam results, see Encounter Report.HPI    DLS: 01/14/2025  Ref by Dr. Merlos  Pt has corneal swelling but wants to see Dr. Cheung prior to having   corneal surgery. 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            Assessment /Plan     For exam results, see Encounter Report.    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    Primary open angle glaucoma (POAG) of both eyes, severe stage    Glaucoma filtering bleb of both eyes    History of glaucoma tube shunt procedure    Pseudophakia of both eyes        Retired Anesthesiologist    Complex Ocular Hx      Right eye  + K-edema likely impacting Va  IOP under good control   Contemplating k-graft --> possible DMEK // DSEK  Will likely benefit from Sx as discussed      Advanced glaucoma OU  Strong Fhx of Glaucoma (2 brothers)     SLT OU 7/9/2020   Trab OU 4/19 in [place]     OD Ahmed Valve 01/21/21   OS Ahmed  9/10/20/   S/p revision of wound OD  ALK Cornea graft in place    Target low teens // single digits --> achieved and discussed    Both eyes --> tolerating well & Good adherence --> CSM  Prolensa once a day  PF Cosopt BID  Alphagan BID    Right eye --> tolerating well & Good adherence --> CSM  Pred A 4 times a day    Left eye --> tolerating well & Good adherence --> CSM  Lotemax 2 times a day    PC IOL OU  Quiet  Observe    RK OU  Quiet  Observe    Hx CRVO OS  Quiet  FU with [facility] Ophthalmology    01/27/2025              Plan  RTC for planned k-transplant  RTC with Dr Merlos as scheduled  RTC Jonatan ELIASN  RTC sooner prn with good understanding as discussed

## 2025-03-04 ENCOUNTER — TELEPHONE (OUTPATIENT)
Dept: OPHTHALMOLOGY | Facility: CLINIC | Age: 85
End: 2025-03-04
Payer: MEDICARE

## 2025-03-04 NOTE — TELEPHONE ENCOUNTER
Returned call to patient offered her June 03 which is [facility]'s first opening, she very rudely told me that would not work I offered her another appt and explained that she could be put on the waiting list patient refused the appt and hung up on me.        ----- Message from Kenia sent at 3/4/2025  8:52 AM CST -----  Type:  Sooner Apoointment RequestCaller is requesting a sooner appointment.  Caller declined first available appointment listed below.  Caller will not accept being placed on the waitlist and is requesting a message be sent to doctor.Name of Caller:Triny is the first available appointment?n/aSymptoms:RoutineWould the patient rather a call back or a response via [facility]? CallbackBe Call Back Number:[phone] Information: Need a callback from [facility]

## 2025-05-26 ENCOUNTER — OFFICE VISIT (OUTPATIENT)
Dept: OPHTHALMOLOGY | Facility: CLINIC | Age: 85
End: 2025-05-26
Payer: MEDICARE

## 2025-05-26 DIAGNOSIS — Z96.1 PSEUDOPHAKIA OF BOTH EYES: ICD-10-CM

## 2025-05-26 DIAGNOSIS — Z96.89 HISTORY OF GLAUCOMA TUBE SHUNT PROCEDURE: ICD-10-CM

## 2025-05-26 DIAGNOSIS — H40.1133 PRIMARY OPEN ANGLE GLAUCOMA (POAG) OF BOTH EYES, SEVERE STAGE: Primary | ICD-10-CM

## 2025-05-26 DIAGNOSIS — Z94.7 HX OF CORNEA TRANSPLANT: ICD-10-CM

## 2025-05-26 DIAGNOSIS — Z98.83 GLAUCOMA FILTERING BLEB OF BOTH EYES: ICD-10-CM

## 2025-05-26 PROBLEM — T15.02XA FOREIGN BODY OF LEFT CORNEA: Status: RESOLVED | Noted: 2022-07-12 | Resolved: 2025-05-26

## 2025-05-26 PROCEDURE — 1101F PT FALLS ASSESS-DOCD LE1/YR: CPT | Mod: CPTII,S$GLB,, | Performed by: OPHTHALMOLOGY

## 2025-05-26 PROCEDURE — 99999 PR PBB SHADOW E&M-EST. PATIENT-LVL III: CPT | Mod: PBBFAC,,, | Performed by: OPHTHALMOLOGY

## 2025-05-26 PROCEDURE — 1159F MED LIST DOCD IN RCRD: CPT | Mod: CPTII,S$GLB,, | Performed by: OPHTHALMOLOGY

## 2025-05-26 PROCEDURE — 3288F FALL RISK ASSESSMENT DOCD: CPT | Mod: CPTII,S$GLB,, | Performed by: OPHTHALMOLOGY

## 2025-05-26 PROCEDURE — 1126F AMNT PAIN NOTED NONE PRSNT: CPT | Mod: CPTII,S$GLB,, | Performed by: OPHTHALMOLOGY

## 2025-05-26 PROCEDURE — G2211 COMPLEX E/M VISIT ADD ON: HCPCS | Mod: S$GLB,,, | Performed by: OPHTHALMOLOGY

## 2025-05-26 PROCEDURE — 1160F RVW MEDS BY RX/DR IN RCRD: CPT | Mod: CPTII,S$GLB,, | Performed by: OPHTHALMOLOGY

## 2025-05-26 PROCEDURE — 99214 OFFICE O/P EST MOD 30 MIN: CPT | Mod: S$GLB,,, | Performed by: OPHTHALMOLOGY

## 2025-05-26 RX ORDER — DORZOLAMIDE HYDROCHLORIDE AND TIMOLOL MALEATE PRESERVATIVE FREE 20; 5 MG/ML; MG/ML
1 SOLUTION/ DROPS OPHTHALMIC 2 TIMES DAILY
Qty: 180 EACH | Refills: 4 | Status: SHIPPED | OUTPATIENT
Start: 2025-05-26

## 2025-05-26 RX ORDER — BRIMONIDINE TARTRATE 1 MG/ML
1 SOLUTION/ DROPS OPHTHALMIC 2 TIMES DAILY
Qty: 10 ML | Refills: 8 | Status: SHIPPED | OUTPATIENT
Start: 2025-05-26 | End: 2026-06-30

## 2025-05-26 NOTE — PROGRESS NOTES
HPI    DLS: 1/27/25 w/ Dr. Cheung    85 y.o. female presents for IOP Check. Patient states she has gotten   cornea surgery OD with Dr. Mustafa in [place] since last visit, vision   has improved. Denies eye pain, headaches, flashes, and floaters.     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            Assessment /Plan     For exam results, see Encounter Report.HPI    DLS: 1/27/25 w/ Dr. Cheung    85 y.o. female presents for IOP Check. Patient states she has gotten   cornea surgery OD with Dr. Mustafa in [place] since last visit, vision   has improved. Denies eye pain, headaches, flashes, and floaters.     Glaucoma OU   Fhx of Glaucoma (2 brothers)   SLT OU 7/9/2020   Trab OU 4/19 in [place]   3x Bleb Needling OU (last one 6/2019)   Ahmed Valve OD 01/21/21   Ahmed OS 9/10/20/ [id number]/LOT    Wound revision w/ patch graft OD 6/23/22   S/P Suture removal OS 07 /12/22   RK OU    PCIOL OU   Yag OU    H/o Iritis   MMC OU   ERM OU   Avanced atrophic nonexudative age related macular degeneration OU Sub   foveal with involvement  and sub retinal fluid   Chalazion of Upper left eyelid           PF Cosopt BID OU   Alphagan BID OU   Lotemax BID OS (ran out 1 month ago)  Prednisolone TID OD           Last edited by Nasreen Johnson on 5/26/2025 11:30 AM.            Assessment /Plan      For exam results, see Encounter Report.    Primary open angle glaucoma (POAG) of both eyes, severe stage  -     dorzolamide-timolol, PF, (COSOPT, PF,) 2-0.5 % Dpet ophthalmic solution; Place 1 drop into both eyes 2 (two) times daily.  Dispense: 180 each; Refill: 4  -     brimonidine 0.1% (ALPHAGAN P) 0.1 % Drop; Place 1 drop into both eyes 2 (two) times daily.  Dispense: 10 mL; Refill: 8    Pseudophakia of both eyes    Glaucoma filtering bleb of both eyes    History of glaucoma tube shunt procedure    Hx of cornea transplant        Retired Anesthesiologist    Complex Ocular Hx      Advanced glaucoma OU  Strong Fhx of Glaucoma (2 brothers)     SLT OU 7/9/2020   Trab OU 4/19 in [place]     OD Ahmed Valve 01/21/21   OS Ahmed  9/10/20/   S/p revision of wound OD  ALK Cornea graft in place    Target low teens // single digits --> achieved and discussed    Both eyes --> tolerating well & Good adherence --> CSM  Prolensa once a day --> ?? using  PF Cosopt BID  Alphagan BID    Right eye --> tolerating well & Good adherence --> adust  Pred 1% TID    Left eye --> tolerating well & Good adherence --> adjust  Lotemax 2 times a day --> not using x 1 month --> monitor off    Right eye  SP DSEK DR Mustafa in   Nice result  IOP under good control  Quiet  Observe    PC IOL OU  Quiet  Observe    RK OU  Quiet  Observe    Hx CRVO OS  Quiet  FU with  Ophthalmology    01/27/2025              Plan  RTC as scheduled for Cornea service in  2/2 DSEK OD  RTC with Dr Merlos --> 2 months  RTC Jonatan PRN --> 6 months per patient IOP & consider HVF OD & OCT RNFL  RTC sooner prn with good understanding as discussed

## 2025-06-02 ENCOUNTER — TELEPHONE (OUTPATIENT)
Dept: OPHTHALMOLOGY | Facility: CLINIC | Age: 85
End: 2025-06-02
Payer: MEDICARE

## 2025-07-22 ENCOUNTER — OFFICE VISIT (OUTPATIENT)
Dept: OPHTHALMOLOGY | Facility: CLINIC | Age: 85
End: 2025-07-22
Payer: MEDICARE

## 2025-07-22 DIAGNOSIS — Z94.7 HX OF CORNEA TRANSPLANT: ICD-10-CM

## 2025-07-22 DIAGNOSIS — H40.1133 PRIMARY OPEN ANGLE GLAUCOMA (POAG) OF BOTH EYES, SEVERE STAGE: Primary | ICD-10-CM

## 2025-07-22 DIAGNOSIS — Z96.1 PSEUDOPHAKIA OF BOTH EYES: ICD-10-CM

## 2025-07-22 PROCEDURE — 92133 CPTRZD OPH DX IMG PST SGM ON: CPT | Mod: S$GLB,,, | Performed by: OPHTHALMOLOGY

## 2025-07-22 PROCEDURE — 99999 PR PBB SHADOW E&M-EST. PATIENT-LVL III: CPT | Mod: PBBFAC,,, | Performed by: OPHTHALMOLOGY

## 2025-07-22 PROCEDURE — 1160F RVW MEDS BY RX/DR IN RCRD: CPT | Mod: CPTII,S$GLB,, | Performed by: OPHTHALMOLOGY

## 2025-07-22 PROCEDURE — 99214 OFFICE O/P EST MOD 30 MIN: CPT | Mod: S$GLB,,, | Performed by: OPHTHALMOLOGY

## 2025-07-22 PROCEDURE — 1159F MED LIST DOCD IN RCRD: CPT | Mod: CPTII,S$GLB,, | Performed by: OPHTHALMOLOGY

## 2025-07-22 PROCEDURE — 1126F AMNT PAIN NOTED NONE PRSNT: CPT | Mod: CPTII,S$GLB,, | Performed by: OPHTHALMOLOGY

## 2025-07-22 NOTE — PROGRESS NOTES
HPI     Glaucoma            Comments: Pt reports for 2m IOP check. Denies any pain or irritation. Va   stable. Using drops as directed.           Comments    1. Glaucoma OU   Fhx of Glaucoma (2 brothers)   SLT OU 7/9/2020   Trab OU 4/19 in [place]   3x Bleb Needling OU (last one 6/2019)   Ahmed Valve OD 01/21/21   Ahmed OS 9/10/20/ [id number]/LOT    Wound revision w/ patch graft OD 6/23/22  S/P Suture removal OS 07/12/22  2. RK OU   DSEK OD 4/2025  3. PCIOL OU   Yag OU   4. 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            Assessment /Plan     For exam results, see Encounter Report.      ICD-10-CM ICD-9-CM    1. Primary open angle glaucoma (POAG) of both eyes, severe stage  H40.1133 365.11 Posterior Segment OCT Optic Nerve- Both eyes    Doing well - intraocular pressure is within acceptable range relative to target pressure with no evidence of progression.   Continue current treatment.  Reviewed importance of continued compliance with treatment and follow up.        365.73       2. Pseudophakia of both eyes  Z96.1 V43.1       3. Hx of cornea transplant  Z94.7 V42.5 S/p dsek with dr nettles, doing well           Return to clinic 2-3 months for DFE & HVF 24-2           Both eyes:  PF Cosopt 2 times a day  Alphagan P 2 times a day  Right eye:  Pred A 2 times a day

## 2025-08-18 ENCOUNTER — TELEPHONE (OUTPATIENT)
Dept: OPHTHALMOLOGY | Facility: CLINIC | Age: 85
End: 2025-08-18
Payer: MEDICARE